# Patient Record
Sex: FEMALE | Race: BLACK OR AFRICAN AMERICAN | Employment: OTHER | RURAL
[De-identification: names, ages, dates, MRNs, and addresses within clinical notes are randomized per-mention and may not be internally consistent; named-entity substitution may affect disease eponyms.]

---

## 2017-01-24 ENCOUNTER — OFFICE VISIT (OUTPATIENT)
Dept: CARDIOLOGY CLINIC | Age: 82
End: 2017-01-24

## 2017-01-24 VITALS
BODY MASS INDEX: 29 KG/M2 | HEART RATE: 64 BPM | SYSTOLIC BLOOD PRESSURE: 150 MMHG | RESPIRATION RATE: 16 BRPM | HEIGHT: 62 IN | OXYGEN SATURATION: 99 % | DIASTOLIC BLOOD PRESSURE: 68 MMHG | WEIGHT: 157.6 LBS

## 2017-01-24 DIAGNOSIS — I10 ESSENTIAL HYPERTENSION: ICD-10-CM

## 2017-01-24 DIAGNOSIS — E78.00 HYPERCHOLESTEROLEMIA: ICD-10-CM

## 2017-01-24 DIAGNOSIS — I35.0 NONRHEUMATIC AORTIC VALVE STENOSIS: Primary | ICD-10-CM

## 2017-01-24 DIAGNOSIS — I50.32 DIASTOLIC CHF, CHRONIC (HCC): ICD-10-CM

## 2017-01-24 NOTE — PROGRESS NOTES
Verified patient with two patient identifiers. Medications reviewed/approved by Dr. Christie Dean. Verbal from Dr. Christie Dean to remove the medications that were deleted during the visit.

## 2017-01-24 NOTE — MR AVS SNAPSHOT
Visit Information Date & Time Provider Department Dept. Phone Encounter #  
 1/24/2017  1:00 PM Duy Chapman [facility] [phone] [id number] Follow-up Instructions Return in about 6 months (around 7/24/2017). Follow-up and Disposition History Upcoming Health Maintenance Date Due  
 FOOT EXAM Q1 11/14/1939 DTaP/Tdap/Td series (1 - Tdap) 11/14/1950 ZOSTER VACCINE AGE 60> 11/14/1989 OSTEOPOROSIS SCREENING (DEXA) 11/14/1994 Pneumococcal 65+ High/Highest Risk (2 of 2 - PCV13) 10/7/2014 EYE EXAM RETINAL OR DILATED Q1 3/21/2015 MICROALBUMIN Q1 9/23/2015 GLAUCOMA SCREENING Q2Y 3/21/2016 INFLUENZA AGE 9 TO ADULT 8/1/2016 HEMOGLOBIN A1C Q6M 4/27/2017 MEDICARE YEARLY EXAM 7/29/2017 LIPID PANEL Q1 10/27/2017 Allergies as of 1/24/2017  Review Complete On: 1/24/2017 By: Juan Eugene MD  
  
 Severity Noted Reaction Type Reactions Pcn [Penicillins]  07/12/2013    Unknown (comments) Current Immunizations  Reviewed on 9/23/2014 Name Date Influenza Vaccine 9/23/2014, 10/7/2013 Influenza Vaccine Ry Preciado) 10/7/2015 Pneumococcal Polysaccharide (PPSV-23) 10/7/2013 Not reviewed this visit You Were Diagnosed With   
  
 Codes Comments Nonrheumatic aortic valve stenosis    -  Primary ICD-10-CM: I35.0 ICD-9-CM: 424.1 Essential hypertension     ICD-10-CM: I10 
ICD-9-CM: 401.9 Hypercholesterolemia     ICD-10-CM: E78.00 ICD-9-CM: 272.0 Diastolic CHF, chronic (HCC)     ICD-10-CM: I50.32 
ICD-9-CM: 428.32, 428.0 Vitals BP Pulse Resp Height(growth percentile) Weight(growth percentile) SpO2  
 150/68 (BP 1 Location: Left arm, BP Patient Position: Sitting) 64 16 5' 1.5\" (1.562 m) 157 lb 9.6 oz (71.5 kg) 99% BMI OB Status Smoking Status 29.3 kg/m2 Postmenopausal Former Smoker Vitals History BMI and BSA Data Body Mass Index Body Surface Area 29.3 kg/m 2 1.76 m 2 Preferred Pharmacy Pharmacy Name Phone [facility], ER - [address] [phone] Your Updated Medication List  
  
   
This list is accurate as of: 1/24/17  1:31 PM.  Always use your most recent med list.  
  
  
  
  
 aspirin delayed-release 81 mg tablet Take  by mouth daily. AZOPT 1 % ophthalmic suspension Generic drug:  brinzolamide Administer 1 Drop to both eyes three (3) times daily. cilostazol 50 mg tablet Commonly known as:  PLETAL  
TAKE 1 TABLET BY MOUTH DAILY BEFORE BREAKFAST AND DINNER  
  
 ferrous sulfate 325 mg (65 mg iron) tablet Take 1 Tab by mouth two (2) times daily (with meals). LUMIGAN 0.03 % ophthalmic drops Generic drug:  bimatoprost  
Administer 1 Drop to both eyes every evening.  
  
 multivitamin tablet Commonly known as:  ONE A DAY Take 1 Tab by mouth daily. olmesartan-hydroCHLOROthiazide 20-12.5 mg per tablet Commonly known as:  BENICAR HCT  
TAKE ONE TABLET BY MOUTH ONCE DAILY  
  
 omeprazole 40 mg capsule Commonly known as:  PRILOSEC  
TAKE 1 CAPSULE BY MOUTH DAILY  
  
 timolol maleate 0.5 % Drpd ophthalmic solution Administer 1 Drop to both eyes daily. traMADol 50 mg tablet Commonly known as:  ULTRAM  
Take 1 Tab by mouth every six (6) hours as needed for Pain. Max Daily Amount: 200 mg. ZETIA 10 mg tablet Generic drug:  ezetimibe TAKE 1 TABLET BY MOUTH DAILY Follow-up Instructions Return in about 6 months (around 7/24/2017). Introducing [facility]! [employer] introduces [employer] patient portal. Now you can access parts of your medical record, email your doctor's office, and request medication refills online. 1. In your internet browser, go to [email or web address] 2. Click on the First Time User? Click Here link in the Sign In box. You will see the New Member Sign Up page. 3. Enter your [employer] Access Code exactly as it appears below. You will not need to use this code after youve completed the sign-up process. If you do not sign up before the expiration date, you must request a new code. · [employer] Access Code: [email or web address] Expires: 1/25/2017 10:42 AM 
 
4. Enter the last four digits of your Social Security Number (xxxx) and Date of Birth (mm/dd/yyyy) as indicated and click Submit. You will be taken to the next sign-up page. 5. Create a [employer] ID. This will be your [employer] login ID and cannot be changed, so think of one that is secure and easy to remember. 6. Create a [employer] password. You can change your password at any time. 7. Enter your Password Reset Question and Answer. This can be used at a later time if you forget your password. 8. Enter your e-mail address. You will receive e-mail notification when new information is available in [address] 9. Click Sign Up. You can now view and download portions of your medical record. 10. Click the Download Summary menu link to download a portable copy of your medical information. If you have questions, please visit the Frequently Asked Questions section of the [employer] website. Remember, [employer] is NOT to be used for urgent needs. For medical emergencies, dial 911. Now available from your iPhone and Android! Please provide this summary of care documentation to your next provider. Your primary care clinician is listed as Letty Borrego. If you have any questions after today's visit, please call [phone].

## 2017-01-24 NOTE — PROGRESS NOTES
Moon Lowe is a 80 y.o. female is here for routine f/u. The patient denies chest pain/ shortness of breath, orthopnea, PND, LE edema, palpitations, syncope, presyncope or fatigue. Patient Active Problem List    Diagnosis Date Noted    Diastolic CHF, chronic ([facility] 07/20/2016    Stroke ([facility]     Glucose intolerance (impaired glucose tolerance)     Helicobacter pylori gastritis (chronic gastritis), treated 05/07/2014    Esophagitis, reflux 05/07/2014    Hiatal hernia 05/07/2014    Anemia 04/09/2014    Hypertension     Hypercholesterolemia     Chronic kidney disease     Aortic stenosis       Janet Saab MD  Past Medical History   Diagnosis Date    Aortic stenosis      Echo 4/15 mod stenosis . 9-1.0 cm2    Chronic kidney disease     Glucose intolerance (impaired glucose tolerance)     Hypercholesterolemia     Hypertension     Stroke ([facility] 2010     sl left sided weakness      Past Surgical History   Procedure Laterality Date    Hx gyn       C SECTION 2    Hx appendectomy      Hx endoscopy  4.2014     active gastritis, loaded w/ helicobacter    Hx colonoscopy  4.2014     Allergies   Allergen Reactions    Pcn [Penicillins] Unknown (comments)      No family history on file. Social History     Social History    Marital status:      Spouse name: N/A    Number of children: N/A    Years of education: N/A     Occupational History    Not on file.      Social History Main Topics    Smoking status: Former Smoker     Types: Cigarettes     Quit date: 7/20/2014    Smokeless tobacco: Never Used    Alcohol use No    Drug use: Not on file    Sexual activity: Not on file     Other Topics Concern    Not on file     Social History Narrative      Current Outpatient Prescriptions   Medication Sig    olmesartan-hydroCHLOROthiazide (BENICAR HCT) 20-12.5 mg per tablet TAKE ONE TABLET BY MOUTH ONCE DAILY    ZETIA 10 mg tablet TAKE 1 TABLET BY MOUTH DAILY    multivitamin (ONE A DAY) tablet Take 1 Tab by mouth daily.  cilostazol (PLETAL) 50 mg tablet TAKE 1 TABLET BY MOUTH DAILY BEFORE BREAKFAST AND DINNER    omeprazole (PRILOSEC) 40 mg capsule TAKE 1 CAPSULE BY MOUTH DAILY    traMADol (ULTRAM) 50 mg tablet Take 1 Tab by mouth every six (6) hours as needed for Pain. Max Daily Amount: 200 mg.  ferrous sulfate 325 mg (65 mg iron) tablet Take 1 Tab by mouth two (2) times daily (with meals).  timolol maleate 0.5 % DrpD ophthalmic solution Administer 1 Drop to both eyes daily.  bimatoprost (LUMIGAN) 0.03 % ophthalmic drops Administer 1 Drop to both eyes every evening.  aspirin delayed-release 81 mg tablet Take  by mouth daily.  brinzolamide (AZOPT) 1 % ophthalmic suspension Administer 1 Drop to both eyes three (3) times daily. No current facility-administered medications for this visit. Review of Symptoms:    CONST  No weight change. No fever, chills, sweats    ENT No visual changes, URI sx, sore throat    CV  See HPI   RESP  No cough, or sputum, wheezing. Also see HPI   GI  No abdominal pain or change in bowel habits. No heartburn or dysphagia. No melena or rectal bleeding.   No dysuria, urgency, frequency, hematuria   MSKEL  No joint pain, swelling. No muscle pain. SKIN  No rash or lesions. NEURO  No headache, syncope, or seizure. No weakness, loss of sensation, or paresthesias. PSYCH  No low mood or depression  No anxiety. HE/LYMPH  No easy bruising, abnormal bleeding, or enlarged glands.         Physical ExamPhysical Exam:    Visit Vitals    /68 (BP 1 Location: Left arm, BP Patient Position: Sitting)    Pulse 64    Resp 16    Ht 5' 1.5\" (1.562 m)    Wt 157 lb 9.6 oz (71.5 kg)    SpO2 99%    BMI 29.3 kg/m2     Gen: NAD  HEENT:  PERRL, throat clear  Neck: no mass or thyromegaly, no JVD   Heart:  Regular,Nl S1S2,  II/vI murmur, no gallop or rub.   Lungs:  clear  Abdomen:   Soft, non-tender, bowel sounds are active.   Extremities: No edema  Pulse: symmetric  Neuro: A&O times 3, WNL      Cardiographics    CARDIAC TESTING:    ECHO 6/2/16--sev LVH, LVEF 60-65, D1, mod LAE, mod-severe AS (m33/p48), mild AI, mild MS, RVSP <35.       Labs:   Lab Results   Component Value Date/Time    Sodium 144 10/27/2016 11:36 AM    Sodium 141 07/28/2016 10:17 AM    Sodium 141 05/03/2016 11:20 AM    Sodium 143 04/07/2016 03:43 PM    Sodium 144 10/07/2015 11:14 AM    Potassium 5.2 10/27/2016 11:36 AM    Potassium 5.0 07/28/2016 10:17 AM    Potassium 4.6 05/03/2016 11:20 AM    Potassium 4.5 04/07/2016 03:43 PM    Potassium 4.5 10/07/2015 11:14 AM    Chloride 106 10/27/2016 11:36 AM    Chloride 99 07/28/2016 10:17 AM    Chloride 101 05/03/2016 11:20 AM    Chloride 104 04/07/2016 03:43 PM    Chloride 105 10/07/2015 11:14 AM    CO2 25 10/27/2016 11:36 AM    CO2 23 07/28/2016 10:17 AM    CO2 25 05/03/2016 11:20 AM    CO2 23 04/07/2016 03:43 PM    CO2 23 10/07/2015 11:14 AM    Glucose 118 10/27/2016 11:36 AM    Glucose 158 07/28/2016 10:17 AM    Glucose 187 05/03/2016 11:20 AM    Glucose 99 04/07/2016 03:43 PM    Glucose 135 10/07/2015 11:14 AM    BUN 46 10/27/2016 11:36 AM    BUN 41 07/28/2016 10:17 AM    BUN 34 05/03/2016 11:20 AM    BUN 31 04/07/2016 03:43 PM    BUN 26 10/07/2015 11:14 AM    Creatinine 2.39 10/27/2016 11:36 AM    Creatinine 2.09 07/28/2016 10:17 AM    Creatinine 2.26 05/03/2016 11:20 AM    Creatinine 1.20 04/07/2016 03:43 PM    Creatinine 1.33 10/07/2015 11:14 AM    BUN/Creatinine ratio 19 10/27/2016 11:36 AM    BUN/Creatinine ratio 20 07/28/2016 10:17 AM    BUN/Creatinine ratio 15 05/03/2016 11:20 AM    BUN/Creatinine ratio 26 04/07/2016 03:43 PM    BUN/Creatinine ratio 20 10/07/2015 11:14 AM    GFR est AA 21 10/27/2016 11:36 AM    GFR est AA 24 07/28/2016 10:17 AM    GFR est AA 22 05/03/2016 11:20 AM    GFR est AA 47 04/07/2016 03:43 PM    GFR est AA 42 10/07/2015 11:14 AM    GFR est non-AA 18 10/27/2016 11:36 AM    GFR est non-AA 21 07/28/2016 10:17 AM    GFR est non-AA 19 05/03/2016 11:20 AM    GFR est non-AA 41 04/07/2016 03:43 PM    GFR est non-AA 36 10/07/2015 11:14 AM    Calcium 9.9 10/27/2016 11:36 AM    Calcium 10.2 07/28/2016 10:17 AM    Calcium 9.6 05/03/2016 11:20 AM    Calcium 10.0 04/07/2016 03:43 PM    Calcium 9.9 10/07/2015 11:14 AM    Bilirubin, total 0.3 10/27/2016 11:36 AM    Bilirubin, total 0.3 07/28/2016 10:17 AM    Bilirubin, total 0.4 04/07/2016 03:43 PM    ALT 10 10/27/2016 11:36 AM    ALT 7 07/28/2016 10:17 AM    ALT 9 04/07/2016 03:43 PM    ALT 7 09/23/2014 10:17 AM    ALT 11 10/07/2013 09:47 AM    AST 15 10/27/2016 11:36 AM    AST 12 07/28/2016 10:17 AM    AST 13 04/07/2016 03:43 PM    AST 21 03/24/2015 02:45 PM    Alk. phosphatase 62 10/27/2016 11:36 AM    Alk. phosphatase 54 07/28/2016 10:17 AM    Alk.  phosphatase 65 04/07/2016 03:43 PM    Protein, total 6.9 10/27/2016 11:36 AM    Protein, total 6.8 07/28/2016 10:17 AM    Protein, total 6.8 04/07/2016 03:43 PM    Albumin 4.0 10/27/2016 11:36 AM    Albumin 4.1 07/28/2016 10:17 AM    Albumin 4.2 04/07/2016 03:43 PM    Albumin 4.0 09/23/2014 10:17 AM    Albumin 4.1 03/21/2014 09:43 AM    A-G Ratio 1.4 10/27/2016 11:36 AM    A-G Ratio 1.5 07/28/2016 10:17 AM    A-G Ratio 1.6 04/07/2016 03:43 PM     No results found for: CPK, CPKX, CPX  Lab Results   Component Value Date/Time    Cholesterol, total 168 10/27/2016 11:36 AM    Cholesterol, total 157 03/24/2015 02:45 PM    Cholesterol, total 187 09/23/2014 10:17 AM    Cholesterol, total 138 10/07/2013 09:47 AM    HDL Cholesterol 55 10/27/2016 11:36 AM    HDL Cholesterol 49 03/24/2015 02:45 PM    HDL Cholesterol 74 09/23/2014 10:17 AM    HDL Cholesterol 52 10/07/2013 09:47 AM    LDL, calculated 88 10/27/2016 11:36 AM    LDL, calculated 84 03/24/2015 02:45 PM    LDL, calculated 93 09/23/2014 10:17 AM    LDL, calculated 69 10/07/2013 09:47 AM    Triglyceride 125 10/27/2016 11:36 AM    Triglyceride 122 03/24/2015 02:45 PM    Triglyceride 101 09/23/2014 10:17 AM    Triglyceride 83 10/07/2013 09:47 AM     No results found for this or any previous visit. Assessment:         Patient Active Problem List    Diagnosis Date Noted    Diastolic CHF, chronic ([facility] 07/20/2016    Stroke ([facility]     Glucose intolerance (impaired glucose tolerance)     Helicobacter pylori gastritis (chronic gastritis), treated 05/07/2014    Esophagitis, reflux 05/07/2014    Hiatal hernia 05/07/2014    Anemia 04/09/2014    Hypertension     Hypercholesterolemia     Chronic kidney disease     Aortic stenosis         Plan:     Doing well with no adverse cardiac symptoms. Lipids and labs followed by PCP. Continue current care and f/u in 6 months.     Tanvi Anderson MD

## 2017-03-17 ENCOUNTER — TELEPHONE (OUTPATIENT)
Dept: FAMILY MEDICINE CLINIC | Age: 82
End: 2017-03-17

## 2017-03-17 NOTE — TELEPHONE ENCOUNTER
Pt needs follow up from ER for leg weakness. Made appointment with Je Hickman for Monday March 20, 2017 @ 2:30. Pt's aid aware.

## 2017-03-17 NOTE — TELEPHONE ENCOUNTER
Patient would like to be worked in one day next week (has transportation to schedule in advance) for several spells of falling. Caregiver notes the rescue squad had been called and that she went to [facility]. Notes in [address] [facility] now.

## 2017-04-04 ENCOUNTER — OFFICE VISIT (OUTPATIENT)
Dept: FAMILY MEDICINE CLINIC | Age: 82
End: 2017-04-04

## 2017-04-04 VITALS
WEIGHT: 157.4 LBS | OXYGEN SATURATION: 99 % | BODY MASS INDEX: 27.89 KG/M2 | HEART RATE: 66 BPM | SYSTOLIC BLOOD PRESSURE: 120 MMHG | RESPIRATION RATE: 20 BRPM | DIASTOLIC BLOOD PRESSURE: 54 MMHG | HEIGHT: 63 IN | TEMPERATURE: 98 F

## 2017-04-04 DIAGNOSIS — I10 ESSENTIAL HYPERTENSION: Primary | ICD-10-CM

## 2017-04-04 DIAGNOSIS — E78.5 HYPERLIPIDEMIA, UNSPECIFIED HYPERLIPIDEMIA TYPE: ICD-10-CM

## 2017-04-04 RX ORDER — EZETIMIBE 10 MG/1
10 TABLET ORAL DAILY
Qty: 90 TAB | Refills: 3 | Status: SHIPPED | OUTPATIENT
Start: 2017-04-04 | End: 2018-04-05 | Stop reason: SDUPTHER

## 2017-04-04 RX ORDER — CILOSTAZOL 50 MG/1
TABLET ORAL
Qty: 180 TAB | Refills: 11 | Status: CANCELLED | OUTPATIENT
Start: 2017-04-04

## 2017-04-04 RX ORDER — OLMESARTAN MEDOXOMIL AND HYDROCHLOROTHIAZIDE 20/12.5 20; 12.5 MG/1; MG/1
TABLET ORAL
Qty: 90 TAB | Refills: 0 | Status: SHIPPED | OUTPATIENT
Start: 2017-04-04 | End: 2017-08-14 | Stop reason: SDUPTHER

## 2017-04-04 NOTE — MR AVS SNAPSHOT
Visit Information Date & Time Provider Department Dept. Phone Encounter #  
 4/4/2017 11:30 AM Marcelo Melendez [address] [phone] [id number] Follow-up Instructions Return in about 2 months (around 6/4/2017). Follow-up and Disposition History Your Appointments 8/4/2017 11:20 AM  
ESTABLISHED PATIENT with Tatyana Mccarty MD  
[address] [facility]) Appt Note: 6 MO FU $0CP  
 [address] [id number] [phone]  
  
   
 [address] [address] Upcoming Health Maintenance Date Due  
 FOOT EXAM Q1 11/14/1939 DTaP/Tdap/Td series (1 - Tdap) 11/14/1950 ZOSTER VACCINE AGE 60> 11/14/1989 OSTEOPOROSIS SCREENING (DEXA) 11/14/1994 Pneumococcal 65+ Low/Medium Risk (2 of 2 - PCV13) 10/7/2014 EYE EXAM RETINAL OR DILATED Q1 3/21/2015 MICROALBUMIN Q1 9/23/2015 GLAUCOMA SCREENING Q2Y 3/21/2016 INFLUENZA AGE 9 TO ADULT 8/1/2016 HEMOGLOBIN A1C Q6M 4/27/2017 MEDICARE YEARLY EXAM 7/29/2017 LIPID PANEL Q1 10/27/2017 Allergies as of 4/4/2017  Review Complete On: 4/4/2017 By: Marcelo Melendez MD  
  
 Severity Noted Reaction Type Reactions Pcn [Penicillins]  07/12/2013    Unknown (comments) Current Immunizations  Reviewed on 9/23/2014 Name Date Influenza Vaccine 9/23/2014, 10/7/2013 Influenza Vaccine Evins Sluder) 10/7/2015 Pneumococcal Polysaccharide (PPSV-23) 10/7/2013 Not reviewed this visit You Were Diagnosed With   
  
 Codes Comments Essential hypertension    -  Primary ICD-10-CM: I10 
ICD-9-CM: 401.9 Hyperlipidemia, unspecified hyperlipidemia type     ICD-10-CM: E78.5 ICD-9-CM: 272.4 Vitals BP Pulse Temp Resp Height(growth percentile) Weight(growth percentile)  120/54 (BP 1 Location: Left arm, BP Patient Position: Sitting) 66 98 °F (36.7 °C) (Temporal) 20 5' 3\" (1.6 m) 157 lb 6.4 oz (71.4 kg) SpO2 BMI OB Status Smoking Status 99% 27.88 kg/m2 Postmenopausal Former Smoker BMI and BSA Data Body Mass Index Body Surface Area  
 27.88 kg/m 2 1.78 m 2 Preferred Pharmacy Pharmacy Name Phone [address] [phone] Your Updated Medication List  
  
   
This list is accurate as of: 4/4/17  1:07 PM.  Always use your most recent med list.  
  
  
  
  
 aspirin delayed-release 81 mg tablet Take  by mouth daily. cilostazol 50 mg tablet Commonly known as:  PLETAL  
TAKE 1 TABLET BY MOUTH DAILY BEFORE BREAKFAST AND DINNER  
  
 ezetimibe 10 mg tablet Commonly known as:  Shelba Fleet Take 1 Tab by mouth daily. Indications: cholesterol and heart Iron 325 mg (65 mg iron) tablet Generic drug:  ferrous sulfate TAKE ONE TABLET BY MOUTH TWICE DAILY WITH MEALS  
  
 LUMIGAN 0.03 % ophthalmic drops Generic drug:  bimatoprost  
Administer 1 Drop to both eyes every evening.  
  
 multivitamin tablet Commonly known as:  ONE A DAY Take 1 Tab by mouth daily. olmesartan-hydroCHLOROthiazide 20-12.5 mg per tablet Commonly known as:  BENICAR HCT  
TAKE ONE TABLET BY MOUTH ONCE DAILY  Indications: pressure  
  
 omeprazole 40 mg capsule Commonly known as:  PRILOSEC  
TAKE 1 CAPSULE BY MOUTH DAILY  
  
 timolol maleate 0.5 % Drpd ophthalmic solution Administer 1 Drop to both eyes daily. Prescriptions Sent to Pharmacy Refills  
 olmesartan-hydroCHLOROthiazide (BENICAR HCT) 20-12.5 mg per tablet 0 Sig: TAKE ONE TABLET BY MOUTH ONCE DAILY  Indications: pressure Class: Normal  
 Pharmacy: [address] [place] [address], [address] [place] [address]. #1  #: [phone]  
 ezetimibe (ZETIA) 10 mg tablet 3 Sig: Take 1 Tab by mouth daily. Indications: cholesterol and heart  Class: Normal  
 Pharmacy: [address] [place] [address]. #1  #: [phone] Route: Oral  
  
Follow-up Instructions Return in about 2 months (around 6/4/2017). Patient Instructions To strengthen legs: 
10 shallow squats 3x daily To strengthen arms: 
10 standing pushups 2x daily Patient Instructions History Introducing [facility]! [employer] introduces [employer] patient portal. Now you can access parts of your medical record, email your doctor's office, and request medication refills online. 1. In your internet browser, go to [email or web address] 2. Click on the First Time User? Click Here link in the Sign In box. You will see the New Member Sign Up page. 3. Enter your [employer] Access Code exactly as it appears below. You will not need to use this code after youve completed the sign-up process. If you do not sign up before the expiration date, you must request a new code. · [employer] Access Code: [email or web address] Expires: 6/12/2017 12:41 PM 
 
4. Enter the last four digits of your Social Security Number (xxxx) and Date of Birth (mm/dd/yyyy) as indicated and click Submit. You will be taken to the next sign-up page. 5. Create a [employer] ID. This will be your [employer] login ID and cannot be changed, so think of one that is secure and easy to remember. 6. Create a [employer] password. You can change your password at any time. 7. Enter your Password Reset Question and Answer. This can be used at a later time if you forget your password. 8. Enter your e-mail address. You will receive e-mail notification when new information is available in [address] 9. Click Sign Up. You can now view and download portions of your medical record. 10. Click the Download Summary menu link to download a portable copy of your medical information.  
 
If you have questions, please visit the Frequently Asked Questions section of the [employer]. Remember, [employer] is NOT to be used for urgent needs. For medical emergencies, dial 911. Now available from your iPhone and Android! Please provide this summary of care documentation to your next provider. Your primary care clinician is listed as Júnior Brannon. If you have any questions after today's visit, please call [phone].

## 2017-04-04 NOTE — PROGRESS NOTES
Stiven Maier is a 80 y.o. female presenting for/with:    Check Up      HPI:  Symptoms include fall over her walker 2 wk ago. No LOC. Saw ER 3/14/17. Monitored, had labs, dx with UTI and d/c home. No falls since. Some feelings of weakness with getting up, but no orthostasis. Treatment to date: given abx for UTI, and rest.    Hypertension. Blood pressures have been stable. Management at last visit included continuing current regimen . Current regimen: angiotensin II receptor antagonist and thiazide diuretic. Symptoms include no sx. Patient denies chest pain, palpitations, peripheral edema, headache, blurred vision. Lab review:   Lab Results   Component Value Date/Time    Sodium 141 03/14/2017 01:30 PM    Potassium 3.9 03/14/2017 01:30 PM    Chloride 105 03/14/2017 01:30 PM    CO2 28 03/14/2017 01:30 PM    Anion gap 8 03/14/2017 01:30 PM    Glucose 114 03/14/2017 01:30 PM    BUN 24 03/14/2017 01:30 PM    Creatinine 1.67 03/14/2017 01:30 PM    BUN/Creatinine ratio 14 03/14/2017 01:30 PM    GFR est AA 35 03/14/2017 01:30 PM    GFR est non-AA 29 03/14/2017 01:30 PM    Calcium 9.9 03/14/2017 01:30 PM     Hyperlipidemia. On zetia and pletal. Ritika well. No myalgias, arthralgias, unusual weakness. Lab Results   Component Value Date/Time    Cholesterol, total 168 10/27/2016 11:36 AM    HDL Cholesterol 55 10/27/2016 11:36 AM    LDL, calculated 88 10/27/2016 11:36 AM    VLDL, calculated 25 10/27/2016 11:36 AM    Triglyceride 125 10/27/2016 11:36 AM     Lab Results   Component Value Date/Time    ALT (SGPT) 11 03/14/2017 01:30 PM    AST (SGOT) 16 03/14/2017 01:30 PM    Alk. phosphatase 75 03/14/2017 01:30 PM    Bilirubin, total 0.6 03/14/2017 01:30 PM     PMH, SH, Medications/Allergies: reviewed, on chart.     ROS:  Constitutional: No fever, chills or weight loss  Respiratory: No cough, SOB   CV: No chest pain or Palpitations    Visit Vitals    /54 (BP 1 Location: Left arm, BP Patient Position: Sitting)    Pulse 66  Temp 98 °F (36.7 °C) (Temporal)    Resp 20    Ht 5' 3\" (1.6 m)    Wt 157 lb 6.4 oz (71.4 kg)    SpO2 99%    BMI 27.88 kg/m2     Wt Readings from Last 3 Encounters:   04/04/17 157 lb 6.4 oz (71.4 kg)   03/14/17 152 lb (68.9 kg)   01/24/17 157 lb 9.6 oz (71.5 kg)     Physical Examination: General appearance - alert, well appearing, and in no distress  Mental status - alert, oriented to person, place, and time  Eyes - pupils equal and reactive, extraocular eye movements intact  ENT - bilateral external ears and nose normal. Normal lips  Neck - supple, no significant adenopathy, no thyromegaly or mass  Lymphatics - no palpable lymphadenopathy, no hepatosplenomegaly  Chest - clear to auscultation, no wheezes, rales or rhonchi, symmetric air entry  Heart - normal rate, regular rhythm, normal S1, S2, no murmurs, rubs, clicks or gallops  Extremities - peripheral pulses normal, no pedal edema, no clubbing or cyanosis    A/P  HTN  well controlled. con't current tx. HLD and presumed PAD  well controlled. con't current tx.     F/u 3mo/PRN

## 2017-04-04 NOTE — PATIENT INSTRUCTIONS
To strengthen legs:  10 shallow squats 3x daily    To strengthen arms:  10 standing pushups 2x daily

## 2017-04-25 RX ORDER — CILOSTAZOL 50 MG/1
TABLET ORAL
Qty: 60 TAB | Refills: 11 | Status: SHIPPED | OUTPATIENT
Start: 2017-04-25 | End: 2017-12-21 | Stop reason: SDUPTHER

## 2017-06-06 RX ORDER — OMEPRAZOLE 40 MG/1
CAPSULE, DELAYED RELEASE ORAL
Qty: 90 CAP | Refills: 3 | Status: SHIPPED | OUTPATIENT
Start: 2017-06-06 | End: 2018-04-26 | Stop reason: SDUPTHER

## 2017-09-19 ENCOUNTER — OFFICE VISIT (OUTPATIENT)
Dept: FAMILY MEDICINE CLINIC | Age: 82
End: 2017-09-19

## 2017-09-19 VITALS
OXYGEN SATURATION: 99 % | BODY MASS INDEX: 28.17 KG/M2 | HEIGHT: 63 IN | TEMPERATURE: 98 F | DIASTOLIC BLOOD PRESSURE: 70 MMHG | SYSTOLIC BLOOD PRESSURE: 158 MMHG | WEIGHT: 159 LBS | HEART RATE: 72 BPM

## 2017-09-19 DIAGNOSIS — I10 ESSENTIAL HYPERTENSION: ICD-10-CM

## 2017-09-19 DIAGNOSIS — Z78.0 POSTMENOPAUSAL: ICD-10-CM

## 2017-09-19 DIAGNOSIS — Z13.39 SCREENING FOR ALCOHOLISM: ICD-10-CM

## 2017-09-19 DIAGNOSIS — Z00.00 MEDICARE ANNUAL WELLNESS VISIT, SUBSEQUENT: Primary | ICD-10-CM

## 2017-09-19 DIAGNOSIS — R73.02 GLUCOSE INTOLERANCE (IMPAIRED GLUCOSE TOLERANCE): ICD-10-CM

## 2017-09-19 DIAGNOSIS — E78.00 HYPERCHOLESTEROLEMIA: ICD-10-CM

## 2017-09-19 DIAGNOSIS — I63.9 CEREBROVASCULAR ACCIDENT (CVA), UNSPECIFIED MECHANISM (HCC): ICD-10-CM

## 2017-09-19 DIAGNOSIS — Z13.31 SCREENING FOR DEPRESSION: ICD-10-CM

## 2017-09-19 NOTE — PROGRESS NOTES
Brenda Aviles is a 80 y.o. female presenting for/with:    Hypertension (fu on bp)    HPI:  No recent falls. Strength is better since last visit. Hypertension. Blood pressures have been stable. Management at last visit included continuing current regimen . Current regimen: angiotensin II receptor antagonist and thiazide diuretic. Symptoms include no sx. Patient denies chest pain, palpitations, peripheral edema, headache, blurred vision. Lab review:   Lab Results   Component Value Date/Time    Sodium 141 03/14/2017 01:30 PM    Potassium 3.9 03/14/2017 01:30 PM    Chloride 105 03/14/2017 01:30 PM    CO2 28 03/14/2017 01:30 PM    Anion gap 8 03/14/2017 01:30 PM    Glucose 114 03/14/2017 01:30 PM    BUN 24 03/14/2017 01:30 PM    Creatinine 1.67 03/14/2017 01:30 PM    BUN/Creatinine ratio 14 03/14/2017 01:30 PM    GFR est AA 35 03/14/2017 01:30 PM    GFR est non-AA 29 03/14/2017 01:30 PM    Calcium 9.9 03/14/2017 01:30 PM     Hyperlipidemia. On zetia and pletal. Ritika well. No myalgias, arthralgias, unusual weakness. Lab Results   Component Value Date/Time    Cholesterol, total 168 10/27/2016 11:36 AM    HDL Cholesterol 55 10/27/2016 11:36 AM    LDL, calculated 88 10/27/2016 11:36 AM    VLDL, calculated 25 10/27/2016 11:36 AM    Triglyceride 125 10/27/2016 11:36 AM     Lab Results   Component Value Date/Time    ALT (SGPT) 11 03/14/2017 01:30 PM    AST (SGOT) 16 03/14/2017 01:30 PM    Alk. phosphatase 75 03/14/2017 01:30 PM    Bilirubin, total 0.6 03/14/2017 01:30 PM     PMH, SH, Medications/Allergies: reviewed, on chart.     ROS:  Constitutional: No fever, chills or weight loss  Respiratory: No cough, SOB   CV: No chest pain or Palpitations    Visit Vitals    /70 (BP 1 Location: Left arm, BP Patient Position: Sitting)    Pulse 72    Temp 98 °F (36.7 °C) (Temporal)    Ht 5' 3\" (1.6 m)    Wt 159 lb (72.1 kg)    SpO2 99%    BMI 28.17 kg/m2     Wt Readings from Last 3 Encounters:   09/19/17 159 lb (72.1 kg)   04/04/17 157 lb 6.4 oz (71.4 kg)   03/14/17 152 lb (68.9 kg)     BP Readings from Last 3 Encounters:   09/19/17 158/70   04/04/17 120/54   03/14/17 149/59     Physical Examination: General appearance - alert, well appearing, and in no distress  Mental status - alert, oriented to person, place, and time  Eyes - pupils equal and reactive, extraocular eye movements intact  ENT - bilateral external ears and nose normal. Normal lips  Neck - supple, no significant adenopathy, no thyromegaly or mass  Lymphatics - no palpable lymphadenopathy, no hepatosplenomegaly  Chest - clear to auscultation, no wheezes, rales or rhonchi, symmetric air entry  Heart - normal rate, regular rhythm, normal S1, S2, no murmurs, rubs, clicks or gallops  Extremities - peripheral pulses normal, no pedal edema, no clubbing or cyanosis    A/P  HTN  Up initially, previosly controlled. con't current tx. BP checks, if still up, plan add norvasc 2.5mg qd    HLD and presumed PAD  well controlled. con't current tx. IGT  Check A1c. F/u 3mo/PRN    ______________________________________________________________________    Matthew Soto is a 80 y.o. female and presents for annual Medicare Wellness Visit. Problem List: Reviewed with patient and discussed risk factors. Patient Active Problem List   Diagnosis Code    Hypertension I10    Hypercholesterolemia E78.00    Chronic kidney disease N18.9    Aortic stenosis I35.0    Anemia G42.2    Helicobacter pylori gastritis (chronic gastritis), treated K29.50, B96.81    Esophagitis, reflux K21.0    Hiatal hernia K44.9    Glucose intolerance (impaired glucose tolerance) R73.02    Stroke (HCC) U02.5    Diastolic CHF, chronic (HCC) I50.32       Current medical providers:  Patient Care Team:  Talia Abdul NP as PCP - General (Nurse Practitioner)  Karen Monroy MD (Cardiology)    [facility], [address], Medications/Allergies: reviewed, on chart. Female Alcohol Screening:   On any occasion during the past 3 months, have you had more than 3 drinks containing alcohol? No    Do you average more than 7 drinks per week? No    ROS:  Constitutional: No fever, chills or weight loss  Respiratory: No cough, SOB   CV: No chest pain or Palpitations    Objective:  Visit Vitals    /70 (BP 1 Location: Left arm, BP Patient Position: Sitting)    Pulse 72    Temp 98 °F (36.7 °C) (Temporal)    Ht 5' 3\" (1.6 m)    Wt 159 lb (72.1 kg)    SpO2 99%    BMI 28.17 kg/m2    Body mass index is 28.17 kg/(m^2). Assessment of cognitive impairment: Alert and oriented x 3    Depression Screen:   PHQ over the last two weeks 9/19/2017   PHQ Not Done -   Little interest or pleasure in doing things Not at all   Feeling down, depressed or hopeless Not at all   Total Score PHQ 2 0       Fall Risk Assessment:    Fall Risk Assessment, last 12 mths 9/19/2017   Able to walk? Yes   Fall in past 12 months? Yes   Fall with injury? No   Number of falls in past 12 months 1   Fall Risk Score 1     Functional Ability:   Does the patient exhibit a steady gait? Yes, with walker   How long did it take the patient to get up and walk from a sitting position? 5s   Is the patient self reliant?  (ie can do own laundry, meals, household chores)  Yes, with assistance   Does the patient handle his/her own medications? Yes, with assist.     Does the patient handle his/her own money? Yes, with help     Is the patients home safe (ie good lighting, handrails on stairs and bath, etc.)? yes     Did you notice or did patient express any hearing difficulties? no     Did you notice or did patient express any vision difficulties? no       Advance Care Planning:   Patient was offered the opportunity to discuss advance care planning:  yes     Does patient have an Advance Directive:  no   If no, did you provide information on Caring Connections?   yes       Plan:       Orders Placed This Encounter    Depression Screen Annual    DEXA BONE DENSITY STUDY AXIAL    METABOLIC PANEL, BASIC    LIPID PANEL    HEMOGLOBIN A1C WITH EAG    Annual  Alcohol Screen 15 min ()       Health Maintenance   Topic Date Due    ZOSTER VACCINE AGE 60>  09/14/1989    OSTEOPOROSIS SCREENING (DEXA)  11/14/1994    EYE EXAM RETINAL OR DILATED Q1  03/21/2015    GLAUCOMA SCREENING Q2Y  03/21/2016    HEMOGLOBIN A1C Q6M  04/27/2017    MEDICARE YEARLY EXAM  07/29/2017    Pneumococcal 65+ Low/Medium Risk (2 of 2 - PCV13) 10/23/2017 (Originally 10/7/2014)    INFLUENZA AGE 9 TO ADULT  10/23/2017 (Originally 8/1/2017)    LIPID PANEL Q1  10/27/2017    FOOT EXAM Q1  09/19/2018    MICROALBUMIN Q1  09/19/2018    DTaP/Tdap/Td series (2 - Td) 09/19/2027       *Patient verbalized understanding and agreement with the plan. A copy of the After Visit Summary with personalized health plan was given to the patient today.

## 2017-09-19 NOTE — MR AVS SNAPSHOT
Visit Information Date & Time Provider Department Dept. Phone Encounter #  
 9/19/2017 10:30 AM Mamie Bauer MD [address] [place] [address] [phone] [id number] Follow-up Instructions Return in about 3 months (around 12/19/2017). Follow-up and Disposition History Upcoming Health Maintenance Date Due ZOSTER VACCINE AGE 60> 9/14/1989 OSTEOPOROSIS SCREENING (DEXA) 11/14/1994 EYE EXAM RETINAL OR DILATED Q1 3/21/2015 GLAUCOMA SCREENING Q2Y 3/21/2016 HEMOGLOBIN A1C Q6M 4/27/2017 MEDICARE YEARLY EXAM 7/29/2017 Pneumococcal 65+ Low/Medium Risk (2 of 2 - PCV13) 10/23/2017* INFLUENZA AGE 9 TO ADULT 10/23/2017* LIPID PANEL Q1 10/27/2017 FOOT EXAM Q1 9/19/2018 MICROALBUMIN Q1 9/19/2018 DTaP/Tdap/Td series (2 - Td) 9/19/2027 *Topic was postponed. The date shown is not the original due date. Allergies as of 9/19/2017  Review Complete On: 9/19/2017 By: Mamie Bauer MD  
  
 Severity Noted Reaction Type Reactions Pcn [Penicillins]  07/12/2013    Unknown (comments) Current Immunizations  Reviewed on 9/23/2014 Name Date Influenza Vaccine 9/23/2014, 10/7/2013 Influenza Vaccine Lady Salomon) 10/7/2015 Pneumococcal Polysaccharide (PPSV-23) 10/7/2013 Not reviewed this visit You Were Diagnosed With   
  
 Codes Comments Medicare annual wellness visit, subsequent    -  Primary ICD-10-CM: Z00.00 ICD-9-CM: V70.0 Essential hypertension     ICD-10-CM: I10 
ICD-9-CM: 401.9 Hypercholesterolemia     ICD-10-CM: E78.00 ICD-9-CM: 272.0 Cerebrovascular accident (CVA), unspecified mechanism ([facility] [address]     ICD-10-CM: I63.9 ICD-9-CM: 434.91 Glucose intolerance (impaired glucose tolerance)     ICD-10-CM: R73.02 
ICD-9-CM: 790.22 Screening for alcoholism     ICD-10-CM: Z13.89 ICD-9-CM: V79.1 Screening for depression     ICD-10-CM: Z13.89 ICD-9-CM: V79.0 Postmenopausal     ICD-10-CM: Z78.0 ICD-9-CM: V49.81 Vitals BP Pulse Temp Height(growth percentile) Weight(growth percentile) SpO2  
 158/70 (BP 1 Location: Left arm, BP Patient Position: Sitting) 72 98 °F (36.7 °C) (Temporal) 5' 3\" (1.6 m) 159 lb (72.1 kg) 99% BMI OB Status Smoking Status 28.17 kg/m2 Postmenopausal Former Smoker Vitals History BMI and BSA Data Body Mass Index Body Surface Area  
 28.17 kg/m 2 1.79 m 2 Preferred Pharmacy Pharmacy Name Phone [phone] [phone] Your Updated Medication List  
  
   
This list is accurate as of: 9/19/17 11:50 AM.  Always use your most recent med list.  
  
  
  
  
 aspirin delayed-release 81 mg tablet Take  by mouth daily. cilostazol 50 mg tablet Commonly known as:  PLETAL  
TAKE 1 TABLET BY MOUTH DAILY BEFORE BREAKFAST AND DINNER  Indications: cramps  
  
 ezetimibe 10 mg tablet Commonly known as:  Yves García Take 1 Tab by mouth daily. Indications: cholesterol and heart Iron 325 mg (65 mg iron) tablet Generic drug:  ferrous sulfate TAKE ONE TABLET BY MOUTH TWICE DAILY WITH MEALS  
  
 LUMIGAN 0.03 % ophthalmic drops Generic drug:  bimatoprost  
Administer 1 Drop to both eyes every evening.  
  
 multivitamin tablet Commonly known as:  ONE A DAY Take 1 Tab by mouth daily. olmesartan-hydroCHLOROthiazide 20-12.5 mg per tablet Commonly known as:  BENICAR HCT  
TAKE 1 TABLET BY MOUTH DAILY FOR BLOOD PRESSURE  
  
 omeprazole 40 mg capsule Commonly known as:  PRILOSEC  
TAKE 1 CAPSULE BY MOUTH DAILY  
  
 timolol maleate 0.5 % Drpd ophthalmic solution Administer 1 Drop to both eyes daily. [id number] HEMOGLOBIN A1C WITH EAG [99761 CPT(R)] LIPID PANEL [40217 CPT(R)] METABOLIC PANEL, BASIC [37420 CPT(R)] MO ANNUAL ALCOHOL SCREEN 15 MIN [id number] [facility]] Follow-up Instructions Return in about 3 months (around 12/19/2017). To-Do List   
 09/19/2017 Imaging:  DEXA BONE DENSITY STUDY AXIAL Patient Instructions Please get 3 blood pressure checks done over the next 4 weeks and send me the numbers. Medicare Wellness Visit, Female The best way to live healthy is to have a healthy lifestyle by eating a well-balanced diet, exercising regularly, limiting alcohol and stopping smoking. Regular physical exams and screening tests are another way to keep healthy. Preventive exams provided by your health care provider can find health problems before they become diseases or illnesses. Preventive services including immunizations, screening tests, monitoring and exams can help you take care of your own health. All people over age 72 should have a pneumovax  and and a prevnar shot to prevent pneumonia. These are once in a lifetime unless you and your provider decide differently. All people over 65 should have a yearly flu shot and a tetanus vaccine every 10 years. A bone mass density to screen for osteoporosis or thinning of the bones should be done every 2 years after 65. Screening for diabetes mellitus with a blood sugar test should be done every year. Glaucoma is a disease of the eye due to increased ocular pressure that can lead to blindness and it should be done every year by an eye professional. 
 
Cardiovascular screening tests that check for elevated lipids (fatty part of blood) which can lead to heart disease and strokes should be done every 5 years. Colorectal screening that evaluates for blood or polyps in your colon should be done yearly as a stool test or every five years as a flexible sigmoidoscope or every 10 years as a colonoscopy up to age 76. Breast cancer screening with a mammogram is recommended biennially  for women age 54-69.  
 
Screening for cervical cancer with a pap smear and pelvic exam is recommended for women after age 72 years every 2 years up to age 79 or when the provider and patient decide to stop. If there is a history of cervical abnormalities or other increased risk for cancer then the test is recommended yearly. Hepatitis C screening is also recommended for anyone born between [birth year] through [address]. A shingles vaccine is also recommended once in a lifetime after age 61. Your Medicare Wellness Exam is recommended annually. Here is a list of your current Health Maintenance items with a due date: 
Health Maintenance Due Topic Date Due  Shingles Vaccine  09/14/1989  Bone Density Screening  11/14/1994 [facility] Eye Exam  03/21/2015  Glaucoma Screening   03/21/2016  Hemoglobin A1C    04/27/2017 [facility] Annual Well Visit  07/29/2017 Medicare Wellness Visit, Female The best way to live healthy is to have a healthy lifestyle by eating a well-balanced diet, exercising regularly, limiting alcohol and stopping smoking. Regular physical exams and screening tests are another way to keep healthy. Preventive exams provided by your health care provider can find health problems before they become diseases or illnesses. Preventive services including immunizations, screening tests, monitoring and exams can help you take care of your own health. All people over age 72 should have a pneumovax  and and a prevnar shot to prevent pneumonia. These are once in a lifetime unless you and your provider decide differently. All people over 65 should have a yearly flu shot and a tetanus vaccine every 10 years. A bone mass density to screen for osteoporosis or thinning of the bones should be done every 2 years after 65. Screening for diabetes mellitus with a blood sugar test should be done every year.  
 
Glaucoma is a disease of the eye due to increased ocular pressure that can lead to blindness and it should be done every year by an eye professional. 
 
 Cardiovascular screening tests that check for elevated lipids (fatty part of blood) which can lead to heart disease and strokes should be done every 5 years. Colorectal screening that evaluates for blood or polyps in your colon should be done yearly as a stool test or every five years as a flexible sigmoidoscope or every 10 years as a colonoscopy up to age 76. Breast cancer screening with a mammogram is recommended biennially  for women age 54-69. Screening for cervical cancer with a pap smear and pelvic exam is recommended for women after age 72 years every 2 years up to age 79 or when the provider and patient decide to stop. If there is a history of cervical abnormalities or other increased risk for cancer then the test is recommended yearly. Hepatitis C screening is also recommended for anyone born between 80 through [address]. A shingles vaccine is also recommended once in a lifetime after age 61. Your Medicare Wellness Exam is recommended annually. Here is a list of your current Health Maintenance items with a due date: 
Health Maintenance Due Topic Date Due  Shingles Vaccine  09/14/1989  Bone Density Screening  11/14/1994 [facility] Eye Exam  03/21/2015  Glaucoma Screening   03/21/2016  Hemoglobin A1C    04/27/2017 [facility] Annual Well Visit  07/29/2017 Patient Instructions History Introducing [facility]! [facility] introduces [employer] patient portal. Now you can access parts of your medical record, email your doctor's office, and request medication refills online. 1. In your internet browser, go to [email or web address] 2. Click on the First Time User? Click Here link in the Sign In box. You will see the New Member Sign Up page. 3. Enter your [employer] Access Code exactly as it appears below. You will not need to use this code after youve completed the sign-up process. If you do not sign up before the expiration date, you must request a new code. · [employer] Access Code: [email or web address] Expires: 12/18/2017 11:50 AM 
 
4. Enter the last four digits of your Social Security Number (xxxx) and Date of Birth (mm/dd/yyyy) as indicated and click Submit. You will be taken to the next sign-up page. 5. Create a [employer] Engine ID. This will be your [employer] login ID and cannot be changed, so think of one that is secure and easy to remember. 6. Create a [employer] password. You can change your password at any time. 7. Enter your Password Reset Question and Answer. This can be used at a later time if you forget your password. 8. Enter your e-mail address. You will receive e-mail notification when new information is available in [address] 9. Click Sign Up. You can now view and download portions of your medical record. 10. Click the Download Summary menu link to download a portable copy of your medical information. If you have questions, please visit the Frequently Asked Questions section of the [employer] website. Remember, [employer] is NOT to be used for urgent needs. For medical emergencies, dial 911. Now available from your iPhone and Android! Please provide this summary of care documentation to your next provider. Your primary care clinician is listed as Jimi Funk. If you have any questions after today's visit, please call [phone].

## 2017-09-20 LAB
BUN SERPL-MCNC: 30 MG/DL (ref 8–27)
BUN/CREAT SERPL: 19 (ref 12–28)
CALCIUM SERPL-MCNC: 10.1 MG/DL (ref 8.7–10.3)
CHLORIDE SERPL-SCNC: 107 MMOL/L (ref 96–106)
CHOLEST SERPL-MCNC: 205 MG/DL (ref 100–199)
CO2 SERPL-SCNC: 23 MMOL/L (ref 18–29)
CREAT SERPL-MCNC: 1.58 MG/DL (ref 0.57–1)
EST. AVERAGE GLUCOSE BLD GHB EST-MCNC: 105 MG/DL
GLUCOSE SERPL-MCNC: 109 MG/DL (ref 65–99)
HBA1C MFR BLD: 5.3 % (ref 4.8–5.6)
HDLC SERPL-MCNC: 72 MG/DL
INTERPRETATION: NORMAL
LDLC SERPL CALC-MCNC: 116 MG/DL (ref 0–99)
POTASSIUM SERPL-SCNC: 5.1 MMOL/L (ref 3.5–5.2)
SODIUM SERPL-SCNC: 145 MMOL/L (ref 134–144)
TRIGL SERPL-MCNC: 85 MG/DL (ref 0–149)
VLDLC SERPL CALC-MCNC: 17 MG/DL (ref 5–40)

## 2017-10-02 DIAGNOSIS — R73.02 IGT (IMPAIRED GLUCOSE TOLERANCE): Primary | ICD-10-CM

## 2017-10-16 NOTE — TELEPHONE ENCOUNTER
Mrs Cas Francis [facility] [place]

## 2017-10-17 RX ORDER — LANOLIN ALCOHOL/MO/W.PET/CERES
CREAM (GRAM) TOPICAL
Qty: 180 TAB | Refills: 3 | Status: SHIPPED | OUTPATIENT
Start: 2017-10-17 | End: 2018-07-03 | Stop reason: SDUPTHER

## 2017-12-21 ENCOUNTER — OFFICE VISIT (OUTPATIENT)
Dept: FAMILY MEDICINE CLINIC | Age: 82
End: 2017-12-21

## 2017-12-21 VITALS
BODY MASS INDEX: 28.77 KG/M2 | HEART RATE: 68 BPM | HEIGHT: 63 IN | DIASTOLIC BLOOD PRESSURE: 54 MMHG | TEMPERATURE: 98.4 F | WEIGHT: 162.4 LBS | RESPIRATION RATE: 16 BRPM | SYSTOLIC BLOOD PRESSURE: 149 MMHG

## 2017-12-21 DIAGNOSIS — R73.02 IGT (IMPAIRED GLUCOSE TOLERANCE): Primary | ICD-10-CM

## 2017-12-21 DIAGNOSIS — Z23 ENCOUNTER FOR IMMUNIZATION: ICD-10-CM

## 2017-12-21 DIAGNOSIS — I63.9 CEREBROVASCULAR ACCIDENT (CVA), UNSPECIFIED MECHANISM (HCC): ICD-10-CM

## 2017-12-21 DIAGNOSIS — E78.00 HYPERCHOLESTEROLEMIA: ICD-10-CM

## 2017-12-21 DIAGNOSIS — I73.9 PAD (PERIPHERAL ARTERY DISEASE) (HCC): ICD-10-CM

## 2017-12-21 DIAGNOSIS — I10 ESSENTIAL HYPERTENSION: ICD-10-CM

## 2017-12-21 RX ORDER — CILOSTAZOL 50 MG/1
TABLET ORAL
Qty: 180 TAB | Refills: 3 | Status: SHIPPED | OUTPATIENT
Start: 2017-12-21 | End: 2018-04-26 | Stop reason: SDUPTHER

## 2017-12-21 NOTE — PROGRESS NOTES
Lucho Hernandez is a 80 y.o. female presenting for/with:    Hypertension; Cholesterol Problem; and Follow-up    HPI:  No recent falls. Strength is better since last visit. Hypertension. Blood pressures have been stable. Management at last visit included continuing current regimen . Current regimen: angiotensin II receptor antagonist and thiazide diuretic. Symptoms include no sx. Patient denies chest pain, palpitations, peripheral edema, headache, blurred vision. Lab review:   Lab Results   Component Value Date/Time    Sodium 145 09/19/2017 11:43 AM    Potassium 5.1 09/19/2017 11:43 AM    Chloride 107 09/19/2017 11:43 AM    CO2 23 09/19/2017 11:43 AM    Anion gap 8 03/14/2017 01:30 PM    Glucose 109 09/19/2017 11:43 AM    BUN 30 09/19/2017 11:43 AM    Creatinine 1.58 09/19/2017 11:43 AM    BUN/Creatinine ratio 19 09/19/2017 11:43 AM    GFR est AA 34 09/19/2017 11:43 AM    GFR est non-AA 29 09/19/2017 11:43 AM    Calcium 10.1 09/19/2017 11:43 AM     Hyperlipidemia and PAD. On zetia and pletal. Ritika well. No myalgias, arthralgias, unusual weakness. Lab Results   Component Value Date/Time    Cholesterol, total 205 09/19/2017 11:43 AM    HDL Cholesterol 72 09/19/2017 11:43 AM    LDL, calculated 116 09/19/2017 11:43 AM    VLDL, calculated 17 09/19/2017 11:43 AM    Triglyceride 85 09/19/2017 11:43 AM     Lab Results   Component Value Date/Time    ALT (SGPT) 11 03/14/2017 01:30 PM    AST (SGOT) 16 03/14/2017 01:30 PM    Alk. phosphatase 75 03/14/2017 01:30 PM    Bilirubin, total 0.6 03/14/2017 01:30 PM     PMH, SH, Medications/Allergies: reviewed, on chart.     ROS:  Constitutional: No fever, chills or weight loss  Respiratory: No cough, SOB   CV: No chest pain or Palpitations    Visit Vitals    /54 (BP 1 Location: Left arm, BP Patient Position: Sitting)    Pulse 68    Temp 98.4 °F (36.9 °C) (Oral)    Resp 16    Ht 5' 3\" (1.6 m)    Wt 162 lb 6.4 oz (73.7 kg)    BMI 28.77 kg/m2     Wt Readings from Last 3 Encounters:   12/21/17 162 lb 6.4 oz (73.7 kg)   09/19/17 159 lb (72.1 kg)   04/04/17 157 lb 6.4 oz (71.4 kg)     BP Readings from Last 3 Encounters:   12/21/17 149/54   09/19/17 158/70   04/04/17 120/54     Physical Examination: General appearance - alert, well appearing, and in no distress  Mental status - alert, oriented to person, place, and time  Eyes - pupils equal and reactive, extraocular eye movements intact  ENT - bilateral external ears and nose normal. Normal lips  Neck - supple, no significant adenopathy, no thyromegaly or mass  Lymphatics - no palpable lymphadenopathy, no hepatosplenomegaly  Chest - clear to auscultation, no wheezes, rales or rhonchi, symmetric air entry  Heart - normal rate, regular rhythm, normal S1, S2, no murmurs, rubs, clicks or gallops  Extremities - peripheral pulses normal, no pedal edema, no clubbing or cyanosis    A/P  HTN  A little better. Con't current tx. BP checks. If climbing in future, plan add norvasc 2.5mg qd    HLD and presumed PAD  well controlled. con't current tx. IGT  A1c ok.   Lab Results   Component Value Date/Time    Hemoglobin A1c 5.3 09/19/2017 11:43 AM     F/u 6mo/PRN

## 2017-12-21 NOTE — MR AVS SNAPSHOT
Visit Information Date & Time Provider Department Dept. Phone Encounter #  
 12/21/2017 10:50 AM Idelle Felty, MD [address] [phone] [id number] Upcoming Health Maintenance Date Due ZOSTER VACCINE AGE 60> 9/14/1989 OSTEOPOROSIS SCREENING (DEXA) 11/14/1994 Pneumococcal 65+ Low/Medium Risk (2 of 2 - PCV13) 10/7/2014 EYE EXAM RETINAL OR DILATED Q1 3/21/2015 GLAUCOMA SCREENING Q2Y 3/21/2016 Influenza Age 5 to Adult 8/1/2017 HEMOGLOBIN A1C Q6M 3/19/2018 FOOT EXAM Q1 9/19/2018 MICROALBUMIN Q1 9/19/2018 LIPID PANEL Q1 9/19/2018 MEDICARE YEARLY EXAM 9/20/2018 DTaP/Tdap/Td series (2 - Td) 9/19/2027 Allergies as of 12/21/2017  Review Complete On: 12/21/2017 By: Rell Petty LPN Severity Noted Reaction Type Reactions Pcn [Penicillins]  07/12/2013    Unknown (comments) Current Immunizations  Reviewed on 12/21/2017 Name Date Influenza High Dose Vaccine PF  Incomplete Influenza Vaccine 9/23/2014, 10/7/2013 Influenza Vaccine [facility]) 10/7/2015 Pneumococcal Polysaccharide (PPSV-23) 10/7/2013 Reviewed by Rell Petty LPN on 92/60/3123 at 10:51 AM  
 Reviewed by Rell Petty LPN on 83/42/6930 at 10:51 AM  
You Were Diagnosed With   
  
 Codes Comments IGT (impaired glucose tolerance)    -  Primary ICD-10-CM: R73.02 
ICD-9-CM: 790.22 Essential hypertension     ICD-10-CM: I10 
ICD-9-CM: 401.9 Hypercholesterolemia     ICD-10-CM: E78.00 ICD-9-CM: 272.0 Cerebrovascular accident (CVA), unspecified mechanism ([facility] [address]     ICD-10-CM: I63.9 ICD-9-CM: 434.91   
 PAD (peripheral artery disease) (HCC)     ICD-10-CM: I73.9 ICD-9-CM: 443.9 Encounter for immunization     ICD-10-CM: H54 ICD-9-CM: V03.89 Vitals BP Pulse Temp Resp Height(growth percentile) Weight(growth percentile)  149/54 (BP 1 Location: Left arm, BP Patient Position: Sitting) 68 98.4 °F (36.9 °C) (Oral) 16 5' 3\" (1.6 m) 162 lb 6.4 oz (73.7 kg) BMI OB Status Smoking Status 28.77 kg/m2 Postmenopausal Former Smoker BMI and BSA Data Body Mass Index Body Surface Area 28.77 kg/m 2 1.81 m 2 Preferred Pharmacy Pharmacy Name Phone [address] [phone] Your Updated Medication List  
  
   
This list is accurate as of: 12/21/17 11:14 AM.  Always use your most recent med list.  
  
  
  
  
 aspirin delayed-release 81 mg tablet Take  by mouth daily. cilostazol 50 mg tablet Commonly known as:  PLETAL  
TAKE 1 TABLET BY MOUTH DAILY BEFORE BREAKFAST AND DINNER  Indications: cramps  
  
 ezetimibe 10 mg tablet Commonly known as:  Buel Mace Take 1 Tab by mouth daily. Indications: cholesterol and heart  
  
 ferrous sulfate 325 mg (65 mg iron) tablet Commonly known as:  Iron TAKE ONE TABLET BY MOUTH TWICE DAILY WITH MEALS  
  
 glucose blood VI test strips strip Commonly known as:  ASCENSIA AUTODISC VI, ONE TOUCH ULTRA TEST VI  
TRUE MATRIX. DX R73.02, use to check sugar monthly and as needed for symptoms of low or high sugar LUMIGAN 0.03 % ophthalmic drops Generic drug:  bimatoprost  
Administer 1 Drop to both eyes every evening.  
  
 multivitamin tablet Commonly known as:  ONE A DAY Take 1 Tab by mouth daily. olmesartan-hydroCHLOROthiazide 20-12.5 mg per tablet Commonly known as:  BENICAR HCT  
TAKE 1 TABLET BY MOUTH DAILY FOR BLOOD PRESSURE  
  
 omeprazole 40 mg capsule Commonly known as:  PRILOSEC  
TAKE 1 CAPSULE BY MOUTH DAILY  
  
 timolol maleate 0.5 % Drpd ophthalmic solution Administer 1 Drop to both eyes daily. Prescriptions Sent to Pharmacy Refills  
 cilostazol (PLETAL) 50 mg tablet 3 Sig: TAKE 1 TABLET BY MOUTH DAILY BEFORE BREAKFAST AND DINNER  Indications: cramps  Class: Normal  
 Pharmacy: [address] [place] [address], [address] [place] [address]. #1  #: [phone] We Performed the Following ADMIN INFLUENZA VIRUS VAC [ [facility]] INFLUENZA VIRUS VACCINE, HIGH DOSE SEASONAL, PRESERVATIVE FREE [22743 CPT(R)] Introducing [facility] SERVICES! Calderon Carcamo introduces [employer] patient portal. Now you can access parts of your medical record, email your doctor's office, and request medication refills online. 1. In your internet browser, go to [email or web address] 2. Click on the First Time User? Click Here link in the Sign In box. You will see the New Member Sign Up page. 3. Enter your [employer] Access Code exactly as it appears below. You will not need to use this code after youve completed the sign-up process. If you do not sign up before the expiration date, you must request a new code. · [employer] Access Code: [email or web address] Expires: 3/21/2018 11:14 AM 
 
4. Enter the last four digits of your Social Security Number (xxxx) and Date of Birth (mm/dd/yyyy) as indicated and click Submit. You will be taken to the next sign-up page. 5. Create a [employer] ID. This will be your [employer] login ID and cannot be changed, so think of one that is secure and easy to remember. 6. Create a [employer] password. You can change your password at any time. 7. Enter your Password Reset Question and Answer. This can be used at a later time if you forget your password. 8. Enter your e-mail address. You will receive e-mail notification when new information is available in [address] 9. Click Sign Up. You can now view and download portions of your medical record. 10. Click the Download Summary menu link to download a portable copy of your medical information. If you have questions, please visit the Frequently Asked Questions section of the [employer] website. Remember, [employer] is NOT to be used for urgent needs. For medical emergencies, dial 911. Now available from your iPhone and Android! Please provide this summary of care documentation to your next provider. Your primary care clinician is listed as Enriqueta Kim. If you have any questions after today's visit, please call [phone].

## 2018-01-29 ENCOUNTER — TELEPHONE (OUTPATIENT)
Dept: FAMILY MEDICINE CLINIC | Age: 83
End: 2018-01-29

## 2018-01-29 NOTE — TELEPHONE ENCOUNTER
Caregiver asks if Benicar has been discontinued. The pharmacy noted that patient has not been getting this medication refilled.

## 2018-01-30 NOTE — TELEPHONE ENCOUNTER
Spoke with caregiver. Patient has been out of med since November stated that provider advised her to stop medication. Her Bp checks have been good today reading was 137 / 75. Should they refill med ?

## 2018-01-30 NOTE — TELEPHONE ENCOUNTER
Called to advise caregiver Km Lozano patient does not need to take medication she had already left for the day I have advised patient that she does  Not need to take med but I will call back again tomorrow to be sure Km Mcdonald received message.

## 2018-02-09 ENCOUNTER — TELEPHONE (OUTPATIENT)
Dept: FAMILY MEDICINE CLINIC | Age: 83
End: 2018-02-09

## 2018-02-09 NOTE — TELEPHONE ENCOUNTER
Spoke with patient. Caregiver unavailable to come to phone. Patient has asked that we call her back on Monday since she has already left for the day.

## 2018-02-12 ENCOUNTER — TELEPHONE (OUTPATIENT)
Dept: FAMILY MEDICINE CLINIC | Age: 83
End: 2018-02-12

## 2018-02-12 NOTE — TELEPHONE ENCOUNTER
----- Message from Arturo Burks sent at 2/9/2018  1:08 PM EST -----  Regarding: /Telephone  Pt wanted to know if she is still not taking the Benicar. Best contact number is [phone].

## 2018-02-12 NOTE — TELEPHONE ENCOUNTER
Namita already spoken to caregiver Makayla Oneil advised to hold medication see note from earlier today.

## 2018-02-12 NOTE — TELEPHONE ENCOUNTER
Spoke with caregiver Sasha Leo. Confirmed that patient is to stay off Benicar still per Debbe Domingo Np.

## 2018-03-27 ENCOUNTER — TELEPHONE (OUTPATIENT)
Dept: FAMILY MEDICINE CLINIC | Age: 83
End: 2018-03-27

## 2018-03-27 NOTE — TELEPHONE ENCOUNTER
Pt wants to be set up for an appt due to feeling sluggish and no appetite. Patient prefers Dr. Eli Johnson but will see another provider if he is busy. Patient has Transportation that needs atleast 2 days in advance notice to set up.

## 2018-03-28 NOTE — TELEPHONE ENCOUNTER
Appointment scheduled with Dr Ruben Vaughn @ 220 pm on 4-5-18. Caregiver advised of appointment time and date. She will discuss with patient and call us back if that is not convenient for her.

## 2018-04-05 DIAGNOSIS — E78.5 HYPERLIPIDEMIA, UNSPECIFIED HYPERLIPIDEMIA TYPE: ICD-10-CM

## 2018-04-05 RX ORDER — EZETIMIBE 10 MG/1
TABLET ORAL
Qty: 30 TAB | Refills: 11 | Status: SHIPPED | OUTPATIENT
Start: 2018-04-05 | End: 2018-07-03 | Stop reason: SDUPTHER

## 2018-04-26 ENCOUNTER — OFFICE VISIT (OUTPATIENT)
Dept: FAMILY MEDICINE CLINIC | Age: 83
End: 2018-04-26

## 2018-04-26 VITALS
HEART RATE: 79 BPM | OXYGEN SATURATION: 96 % | BODY MASS INDEX: 27.25 KG/M2 | SYSTOLIC BLOOD PRESSURE: 112 MMHG | RESPIRATION RATE: 20 BRPM | TEMPERATURE: 97.4 F | HEIGHT: 63 IN | DIASTOLIC BLOOD PRESSURE: 60 MMHG | WEIGHT: 153.8 LBS

## 2018-04-26 DIAGNOSIS — K21.9 GASTROESOPHAGEAL REFLUX DISEASE WITHOUT ESOPHAGITIS: ICD-10-CM

## 2018-04-26 DIAGNOSIS — E78.00 HYPERCHOLESTEROLEMIA: ICD-10-CM

## 2018-04-26 DIAGNOSIS — I10 ESSENTIAL HYPERTENSION: ICD-10-CM

## 2018-04-26 DIAGNOSIS — F02.80 LATE ONSET ALZHEIMER'S DISEASE WITHOUT BEHAVIORAL DISTURBANCE (HCC): Primary | ICD-10-CM

## 2018-04-26 DIAGNOSIS — G30.1 LATE ONSET ALZHEIMER'S DISEASE WITHOUT BEHAVIORAL DISTURBANCE (HCC): Primary | ICD-10-CM

## 2018-04-26 DIAGNOSIS — I73.9 PAD (PERIPHERAL ARTERY DISEASE) (HCC): ICD-10-CM

## 2018-04-26 DIAGNOSIS — R73.02 GLUCOSE INTOLERANCE (IMPAIRED GLUCOSE TOLERANCE): ICD-10-CM

## 2018-04-26 RX ORDER — OMEPRAZOLE 40 MG/1
CAPSULE, DELAYED RELEASE ORAL
Qty: 90 CAP | Refills: 3 | Status: SHIPPED | OUTPATIENT
Start: 2018-04-26 | End: 2018-07-03 | Stop reason: SDUPTHER

## 2018-04-26 RX ORDER — CILOSTAZOL 50 MG/1
TABLET ORAL
Qty: 180 TAB | Refills: 3 | Status: SHIPPED | OUTPATIENT
Start: 2018-04-26 | End: 2018-07-03 | Stop reason: SDUPTHER

## 2018-04-26 RX ORDER — DONEPEZIL HYDROCHLORIDE 5 MG/1
5 TABLET, FILM COATED ORAL
Qty: 90 TAB | Refills: 3 | Status: SHIPPED | OUTPATIENT
Start: 2018-04-26 | End: 2018-07-03 | Stop reason: SDUPTHER

## 2018-04-26 NOTE — MR AVS SNAPSHOT
Dany Aguilera 
 
 
 [email or web address] 
[phone] Patient: Ramila Ritter MRN: [id number] LYNN:[birth year] Visit Information Date & Time Provider Department Dept. Phone Encounter #  
 4/26/2018 11:10 AM Barry Yap MD [address] [place] [address] [phone] [id number] Upcoming Health Maintenance Date Due ZOSTER VACCINE AGE 60> 9/14/1989 Bone Densitometry (Dexa) Screening 11/14/1994 Pneumococcal 65+ Low/Medium Risk (2 of 2 - PCV13) 10/7/2014 EYE EXAM RETINAL OR DILATED Q1 3/21/2015 GLAUCOMA SCREENING Q2Y 3/21/2016 HEMOGLOBIN A1C Q6M 3/19/2018 FOOT EXAM Q1 9/19/2018 MICROALBUMIN Q1 9/19/2018 LIPID PANEL Q1 9/19/2018 MEDICARE YEARLY EXAM 9/20/2018 DTaP/Tdap/Td series (2 - Td) 9/19/2027 Allergies as of 4/26/2018  Review Complete On: 4/26/2018 By: Barry Yap MD  
  
 Severity Noted Reaction Type Reactions Pcn [Penicillins]  07/12/2013    Unknown (comments) Current Immunizations  Reviewed on 12/21/2017 Name Date Influenza High Dose Vaccine PF 12/21/2017 Influenza Vaccine 9/23/2014, 10/7/2013 Influenza Vaccine [facility]) 10/7/2015 Pneumococcal Polysaccharide (PPSV-23) 10/7/2013 Not reviewed this visit You Were Diagnosed With   
  
 Codes Comments Late onset Alzheimer's disease without behavioral disturbance    -  Primary ICD-10-CM: G30.1, F02.80 ICD-9-CM: 331.0, 294.10 Essential hypertension     ICD-10-CM: I10 
ICD-9-CM: 401.9 Hypercholesterolemia     ICD-10-CM: E78.00 ICD-9-CM: 272.0 Glucose intolerance (impaired glucose tolerance)     ICD-10-CM: R73.02 
ICD-9-CM: 790.22 PAD (peripheral artery disease) (HCC)     ICD-10-CM: I73.9 ICD-9-CM: 443.9 Gastroesophageal reflux disease without esophagitis     ICD-10-CM: K21.9 ICD-9-CM: 530.81 Vitals BP Pulse Temp Resp Height(growth percentile) 112/60 (BP 1 Location: Left arm, BP Patient Position: Sitting) 79 97.4 °F (36.3 °C) (Temporal) 20 5' 3\" (1.6 m) Weight(growth percentile) SpO2 BMI OB Status Smoking Status 153 lb 12.8 oz (69.8 kg) 96% 27.24 kg/m2 Postmenopausal Former Smoker BMI and BSA Data Body Mass Index Body Surface Area  
 27.24 kg/m 2 1.76 m 2 Preferred Pharmacy Pharmacy Name Phone [address] [phone] Your Updated Medication List  
  
   
This list is accurate as of 4/26/18 12:59 PM.  Always use your most recent med list.  
  
  
  
  
 aspirin delayed-release 81 mg tablet Take  by mouth daily. cilostazol 50 mg tablet Commonly known as:  PLETAL  
TAKE 1 TABLET BY MOUTH DAILY BEFORE BREAKFAST AND DINNER  Indications: cramps  
  
 donepezil 5 mg tablet Commonly known as:  ARICEPT Take 1 Tab by mouth nightly. Indications: memory  
  
 ezetimibe 10 mg tablet Commonly known as:  Naye Cbarera TAKE 1 TABLET BY MOUTH DAILY FOR CHOLESTROL AND HEART  
  
 ferrous sulfate 325 mg (65 mg iron) tablet Commonly known as:  Iron TAKE ONE TABLET BY MOUTH TWICE DAILY WITH MEALS  
  
 glucose blood VI test strips strip Commonly known as:  ASCENSIA AUTODISC VI, ONE TOUCH ULTRA TEST VI  
TRUE MATRIX. DX R73.02, use to check sugar monthly and as needed for symptoms of low or high sugar LUMIGAN 0.03 % ophthalmic drops Generic drug:  bimatoprost  
Administer 1 Drop to both eyes every evening.  
  
 multivitamin tablet Commonly known as:  ONE A DAY Take 1 Tab by mouth daily. omeprazole 40 mg capsule Commonly known as:  PRILOSEC  
TAKE 1 CAPSULE BY MOUTH DAILY  
  
 timolol maleate 0.5 % Drpd ophthalmic solution Administer 1 Drop to both eyes daily. Prescriptions Sent to Pharmacy Refills  
 donepezil (ARICEPT) 5 mg tablet 3 Sig: Take 1 Tab by mouth nightly. Indications: memory  Class: Normal  
 Pharmacy: [address] [place] [address]. #1  #: [phone] Route: Oral  
 cilostazol (PLETAL) 50 mg tablet 3 Sig: TAKE 1 TABLET BY MOUTH DAILY BEFORE BREAKFAST AND DINNER  Indications: cramps Class: Normal  
 Pharmacy: [address] [place] [address]. #1 Ph #: [phone]  
 omeprazole (PRILOSEC) 40 mg capsule 3 Sig: TAKE 1 CAPSULE BY MOUTH DAILY Class: Normal  
 Pharmacy: [address] [place] [address]. #1 Ph #: [phone] Introducing [facility]! [facility] introduces [employer] patient portal. Now you can access parts of your medical record, email your doctor's office, and request medication refills online. 1. In your internet browser, go to [email or web address] 2. Click on the First Time User? Click Here link in the Sign In box. You will see the New Member Sign Up page. 3. Enter your [employer] Access Code exactly as it appears below. You will not need to use this code after youve completed the sign-up process. If you do not sign up before the expiration date, you must request a new code. · [employer] Access Code: [email or web address] Expires: 7/25/2018 12:59 PM 
 
4. Enter the last four digits of your Social Security Number (xxxx) and Date of Birth (mm/dd/yyyy) as indicated and click Submit. You will be taken to the next sign-up page. 5. Create a Perfect Chann[employer] ID. This will be your [employer] login ID and cannot be changed, so think of one that is secure and easy to remember. 6. Create a [employer] password. You can change your password at any time. 7. Enter your Password Reset Question and Answer. This can be used at a later time if you forget your password. 8. Enter your e-mail address. You will receive e-mail notification when new information is available in [address] 9. Click Sign Up. You can now view and download portions of your medical record. 10. Click the Download Summary menu link to download a portable copy of your medical information. If you have questions, please visit the Frequently Asked Questions section of the [employer] website. Remember, [employer] is NOT to be used for urgent needs. For medical emergencies, dial 911. Now available from your iPhone and Android! Please provide this summary of care documentation to your next provider. Your primary care clinician is listed as Rojelio Bell. If you have any questions after today's visit, please call [phone].

## 2018-04-26 NOTE — PROGRESS NOTES
1. Have you been to the ER, urgent care clinic since your last visit? Hospitalized since your last visit? No    2. Have you seen or consulted any other health care providers outside of the [facility] since your last visit? Include any pap smears or colon screening.  No

## 2018-04-26 NOTE — PROGRESS NOTES
Madisyn Arambula is a 80 y.o. female presenting for/with:    Fall (x3 Since Last Visit.) and Decreased Appetite    HPI:  More falls lately. No serious injury though. Memory gradually worsening. Strength is about the same since last visit. Using walker regularly now. Hypertension. Blood pressures have been stable. Management at last visit included continuing current regimen . Current regimen: angiotensin II receptor antagonist and thiazide diuretic. Symptoms include no sx. Patient denies chest pain, palpitations, peripheral edema, headache, blurred vision. Lab review:   Lab Results   Component Value Date/Time    Sodium 145 (H) 09/19/2017 11:43 AM    Potassium 5.1 09/19/2017 11:43 AM    Chloride 107 (H) 09/19/2017 11:43 AM    CO2 23 09/19/2017 11:43 AM    Anion gap 8 03/14/2017 01:30 PM    Glucose 109 (H) 09/19/2017 11:43 AM    BUN 30 (H) 09/19/2017 11:43 AM    Creatinine 1.58 (H) 09/19/2017 11:43 AM    BUN/Creatinine ratio 19 09/19/2017 11:43 AM    GFR est AA 34 (L) 09/19/2017 11:43 AM    GFR est non-AA 29 (L) 09/19/2017 11:43 AM    Calcium 10.1 09/19/2017 11:43 AM     Hyperlipidemia and PAD. On zetia and pletal. Ritika well. No myalgias, arthralgias, unusual weakness. Lab Results   Component Value Date/Time    Cholesterol, total 205 (H) 09/19/2017 11:43 AM    HDL Cholesterol 72 09/19/2017 11:43 AM    LDL, calculated 116 (H) 09/19/2017 11:43 AM    VLDL, calculated 17 09/19/2017 11:43 AM    Triglyceride 85 09/19/2017 11:43 AM     Lab Results   Component Value Date/Time    ALT (SGPT) 11 (L) 03/14/2017 01:30 PM    AST (SGOT) 16 03/14/2017 01:30 PM    Alk. phosphatase 75 03/14/2017 01:30 PM    Bilirubin, total 0.6 03/14/2017 01:30 PM     PMH, SH, Medications/Allergies: reviewed, on chart.     ROS:  Constitutional: No fever, chills or weight loss  Respiratory: No cough, SOB   CV: No chest pain or Palpitations    Visit Vitals    /60 (BP 1 Location: Left arm, BP Patient Position: Sitting)    Pulse 79    Temp 97.4 °F (36.3 °C) (Temporal)    Resp 20    Ht 5' 3\" (1.6 m)    Wt 153 lb 12.8 oz (69.8 kg)    SpO2 96%    BMI 27.24 kg/m2     Wt Readings from Last 3 Encounters:   04/26/18 153 lb 12.8 oz (69.8 kg)   12/21/17 162 lb 6.4 oz (73.7 kg)   09/19/17 159 lb (72.1 kg)   -9#  BP Readings from Last 3 Encounters:   04/26/18 112/60   12/21/17 149/54   09/19/17 158/70     Physical Examination: General appearance - alert, well appearing, and in no distress  Mental status - alert, oriented to person, place, and time  Eyes - pupils equal and reactive, extraocular eye movements intact  ENT - bilateral external ears and nose normal. Normal lips  Neck - supple, no significant adenopathy, no thyromegaly or mass  Lymphatics - no palpable lymphadenopathy, no hepatosplenomegaly  Chest - clear to auscultation, no wheezes, rales or rhonchi, symmetric air entry  Heart - normal rate, regular rhythm, normal S1, S2, no murmurs, rubs, clicks or gallops  Extremities - peripheral pulses normal, no pedal edema, no clubbing or cyanosis  Neuro- Mini-cog: Abn clock, 0/3 recall. A/P  Weakness and falls  BP is ok, SaO2 is ok, and exam is benign. Memory gradually worsening c/w SDAT. Try adding aricept 5mg daily. HTN  Much better now, off all meds. Probably due to wt loss. Stay off benicar HCT. If climbing in future, plan r/s plain benicar. HLD and PAD  well controlled. con't current tx. IGT  A1c ok. Weight down. Probably don't need recheck if wt <170#.    Lab Results   Component Value Date/Time    Hemoglobin A1c 5.3 09/19/2017 11:43 AM     F/u 2mo/PRN

## 2018-04-26 NOTE — PATIENT INSTRUCTIONS
Alzheimer's Disease: Care Instructions  Your Care Instructions    Alzheimer's disease is a type of dementia. It causes memory loss and affects judgment, language, and behavior. You may have trouble making decisions or may get lost in places that you used to know well. Alzheimer's disease is different than mild memory loss that occurs with aging. It is not clear what causes Alzheimer's disease, but it is the most common form of dementia in older adults. Finding out that you have this disease is a shock. You may be afraid and worried about how the condition will change your life. Although there is no cure at this time, medicine in some cases may slow memory loss for a while. Other medicines may be able to help you sleep or cope with depression and behavior changes. Alzheimer's disease is different for everyone. It may take many years to develop. In some cases, people can function well for a long time. In the early stage of the disease, you can do things at home to make life easier and safer. You also can keep doing your hobbies and other activities. Many people find comfort in planning now for their future needs. Follow-up care is a key part of your treatment and safety. Be sure to make and go to all appointments, and call your doctor if you are having problems. It's also a good idea to know your test results and keep a list of the medicines you take. How can you care for yourself at home? Taking care of yourself  · If your doctor gives you medicines, take them exactly as prescribed. Call your doctor if you think you are having a problem with your medicine. You will get more details on the medicines your doctor prescribes. · Eat a balanced diet. Get plenty of whole grains, fruits, and vegetables every day. If you are not hungry at mealtimes, eat snacks at midmorning and in the afternoon. Try drinks such as Boost, Ensure, or Sustacal if you are having trouble keeping your weight up. · Stay active.  Exercise such as walking may slow the decline of your mental abilities. Try to stay active mentally too. Read and work crossword puzzles if you enjoy these activities. · If you have trouble sleeping, do not nap during the day. Get regular exercise (but not within several hours of bedtime). Drink a glass of warm milk or caffeine-free herbal tea before going to bed. · Ask your doctor about support groups and other resources in your area. They can help people who have Alzheimer's disease and their families. · Be patient. You may find that a task takes you longer than it used to. · If you have not already done so, make a list of advance directives. Advance directives are instructions to your doctor and family members about what kind of care you want if you become unable to speak or express yourself. Talk to a  about making a will, if you do not already have one. Keeping schedules  · Develop a routine. You will feel less frustrated or confused if you have a clear, simple plan of what to do every day. ¨ Make lists of your medicines and when to take them. ¨ Write down appointments and other tasks in a calendar. ¨ Put sticky notes around the house to help you remember events and other things you have to do. ¨ Schedule activities and tasks for times of the day when you are best able to handle them. Staying safe  · Tell someone when you are going out and where you are going. Let the person know when you will be back. Before you go out alone, write down where you are going, how to get there, and how to get back home. Do this even if you have gone there many times before. Take someone along with you when possible. · Make your home safe. Tack down rugs, put no-slip tape in the tub, use handrails, and put safety switches on stoves and appliances. · Have a family member or other caregiver tell you whether you are driving badly. Deciding to stop driving is very hard for many people. Driving helps you feel independent.  Your state 's license bureau can do a driving test if there is any question. Plan for other means of getting around when you are no longer able to drive. · Use strong lighting, especially at night. Put night-lights in bedrooms, hallways, and bathrooms. · Lower the hot water temperature setting to 120°F or lower to avoid burns. When should you call for help? Call 911 anytime you think you may need emergency care. For example, call if:  ? · You are lost and do not know whom to call. ? · You are injured and do not know whom to call. ?Call your doctor now or seek immediate medical care if:  ? · Your symptoms suddenly get much worse. ? Watch closely for changes in your health, and be sure to contact your doctor if:  ? · You want more information about how you can take care of yourself. Where can you learn more? Go to http://ilsa-daniel.info/. Enter Y179 in the search box to learn more about \"Alzheimer's Disease: Care Instructions. \"  Current as of: May 12, 2017  Content Version: 11.4  © 7457-2323 [employer]wise, Incorporated. Care instructions adapted under license by [employer] (which disclaims liability or warranty for this information). If you have questions about a medical condition or this instruction, always ask your healthcare professional. [address] any warranty or liability for your use of this information.

## 2018-04-30 ENCOUNTER — TELEPHONE (OUTPATIENT)
Dept: FAMILY MEDICINE CLINIC | Age: 83
End: 2018-04-30

## 2018-04-30 NOTE — TELEPHONE ENCOUNTER
Spoke with pharmacist to confirm patient is no longer taking Benicar. Dr Madeline Castillo did stop the medication at last visit.

## 2018-05-01 ENCOUNTER — TELEPHONE (OUTPATIENT)
Dept: FAMILY MEDICINE CLINIC | Age: 83
End: 2018-05-01

## 2018-05-01 NOTE — TELEPHONE ENCOUNTER
Spoke with caregiver. Advised ok to take Glucerna as directed for decreased appetite. Call office for any concerns.

## 2018-05-01 NOTE — TELEPHONE ENCOUNTER
Pts aide called chong wanted to speak with Aly Lew (JESS) about Ms. Dewey. She has been feeling tired with no appetite due to diabetes she believes. She wanted to know could she give her any juice or something to bring her back up.  Call [phone]

## 2018-07-03 DIAGNOSIS — I73.9 PAD (PERIPHERAL ARTERY DISEASE) (HCC): ICD-10-CM

## 2018-07-03 DIAGNOSIS — E78.5 HYPERLIPIDEMIA, UNSPECIFIED HYPERLIPIDEMIA TYPE: ICD-10-CM

## 2018-07-03 DIAGNOSIS — K21.9 GASTROESOPHAGEAL REFLUX DISEASE WITHOUT ESOPHAGITIS: ICD-10-CM

## 2018-07-03 DIAGNOSIS — G30.1 LATE ONSET ALZHEIMER'S DISEASE WITHOUT BEHAVIORAL DISTURBANCE (HCC): ICD-10-CM

## 2018-07-03 DIAGNOSIS — F02.80 LATE ONSET ALZHEIMER'S DISEASE WITHOUT BEHAVIORAL DISTURBANCE (HCC): ICD-10-CM

## 2018-07-03 RX ORDER — OMEPRAZOLE 40 MG/1
CAPSULE, DELAYED RELEASE ORAL
Qty: 90 CAP | Refills: 3 | Status: SHIPPED | OUTPATIENT
Start: 2018-07-03 | End: 2019-08-07 | Stop reason: SDUPTHER

## 2018-07-03 RX ORDER — CILOSTAZOL 50 MG/1
TABLET ORAL
Qty: 180 TAB | Refills: 3 | Status: SHIPPED | OUTPATIENT
Start: 2018-07-03 | End: 2019-08-07 | Stop reason: SDUPTHER

## 2018-07-03 RX ORDER — DONEPEZIL HYDROCHLORIDE 5 MG/1
5 TABLET, FILM COATED ORAL
Qty: 90 TAB | Refills: 3 | Status: SHIPPED | OUTPATIENT
Start: 2018-07-03 | End: 2019-07-06 | Stop reason: SDUPTHER

## 2018-07-03 RX ORDER — EZETIMIBE 10 MG/1
TABLET ORAL
Qty: 90 TAB | Refills: 3 | Status: SHIPPED | OUTPATIENT
Start: 2018-07-03 | End: 2019-07-06 | Stop reason: SDUPTHER

## 2018-07-03 RX ORDER — LANOLIN ALCOHOL/MO/W.PET/CERES
CREAM (GRAM) TOPICAL
Qty: 180 TAB | Refills: 3 | Status: SHIPPED | OUTPATIENT
Start: 2018-07-03 | End: 2019-07-31 | Stop reason: SDUPTHER

## 2018-08-27 ENCOUNTER — OFFICE VISIT (OUTPATIENT)
Dept: FAMILY MEDICINE CLINIC | Age: 83
End: 2018-08-27

## 2018-08-27 VITALS
DIASTOLIC BLOOD PRESSURE: 70 MMHG | SYSTOLIC BLOOD PRESSURE: 150 MMHG | HEART RATE: 63 BPM | BODY MASS INDEX: 26.22 KG/M2 | TEMPERATURE: 96.1 F | OXYGEN SATURATION: 95 % | HEIGHT: 63 IN | RESPIRATION RATE: 20 BRPM | WEIGHT: 148 LBS

## 2018-08-27 NOTE — MR AVS SNAPSHOT
[address] [facility] 
 
 
 [address] 
[phone] Patient: Hollis Castellanos MRN: [id number] QRP:[id number] Visit Information Date & Time Provider Department Dept. Phone Encounter #  
 8/27/2018 10:30 AM Poli Rangel NP [facility] [address] [facility] [phone] [id number] Upcoming Health Maintenance Date Due ZOSTER VACCINE AGE 60> 9/14/1989 Bone Densitometry (Dexa) Screening 11/14/1994 Pneumococcal 65+ Low/Medium Risk (2 of 2 - PCV13) 10/7/2014 EYE EXAM RETINAL OR DILATED Q1 3/21/2015 GLAUCOMA SCREENING Q2Y 3/21/2016 HEMOGLOBIN A1C Q6M 3/19/2018 Influenza Age 5 to Adult 8/1/2018 MICROALBUMIN Q1 9/19/2018 LIPID PANEL Q1 9/19/2018 MEDICARE YEARLY EXAM 9/20/2018 FOOT EXAM Q1 6/4/2019 DTaP/Tdap/Td series (2 - Td) 9/19/2027 Allergies as of 8/27/2018  Review Complete On: 8/27/2018 By: Iker Anderson RN Severity Noted Reaction Type Reactions Pcn [Penicillins]  07/12/2013    Unknown (comments) Current Immunizations  Reviewed on 12/21/2017 Name Date Influenza High Dose Vaccine PF 12/21/2017 Influenza Vaccine 9/23/2014, 10/7/2013 Influenza Vaccine Rema Bible) 10/7/2015 Pneumococcal Polysaccharide (PPSV-23) 10/7/2013 Not reviewed this visit You Were Diagnosed With   
  
 Codes Comments BMI 26.0-26.9,adult    -  Primary ICD-10-CM: A08.28 
ICD-9-CM: V85.22 Vitals BP Pulse Temp Resp Height(growth percentile) 150/70 (BP 1 Location: Left arm, BP Patient Position: Sitting) 63 96.1 °F (35.6 °C) (Temporal) 20 5' 3\" (1.6 m) Weight(growth percentile) SpO2 BMI OB Status Smoking Status 148 lb (67.1 kg) 95% 26.22 kg/m2 Postmenopausal Former Smoker BMI and BSA Data Body Mass Index Body Surface Area  
 26.22 kg/m 2 1.73 m 2 Preferred Pharmacy Pharmacy Name Phone  [facility], [place] - [address] [phone] Your Updated Medication List  
  
   
This list is accurate as of 8/27/18 11:34 AM.  Always use your most recent med list.  
  
  
  
  
 aspirin delayed-release 81 mg tablet Take  by mouth daily. cilostazol 50 mg tablet Commonly known as:  PLETAL  
TAKE 1 TABLET BY MOUTH DAILY BEFORE BREAKFAST AND DINNER  Indications: cramps  
  
 donepezil 5 mg tablet Commonly known as:  ARICEPT Take 1 Tab by mouth nightly. Indications: memory  
  
 ezetimibe 10 mg tablet Commonly known as:  Levorn Block TAKE 1 TABLET BY MOUTH DAILY FOR CHOLESTROL AND HEART  
  
 ferrous sulfate 325 mg (65 mg iron) tablet Commonly known as:  Iron TAKE ONE TABLET BY MOUTH TWICE DAILY WITH MEALS  
  
 glucose blood VI test strips strip Commonly known as:  ASCENSIA AUTODISC VI, ONE TOUCH ULTRA TEST VI  
TRUE MATRIX. DX R73.02, use to check sugar monthly and as needed for symptoms of low or high sugar LUMIGAN 0.03 % ophthalmic drops Generic drug:  bimatoprost  
Administer 1 Drop to both eyes every evening.  
  
 multivitamin tablet Commonly known as:  ONE A DAY Take 1 Tab by mouth daily. omeprazole 40 mg capsule Commonly known as:  PRILOSEC  
TAKE 1 CAPSULE BY MOUTH DAILY  
  
 timolol maleate 0.5 % Drpd ophthalmic solution Administer 1 Drop to both eyes daily. Introducing [facility]! Ella Johnston introduces [employer] patient portal. Now you can access parts of your medical record, email your doctor's office, and request medication refills online. 1. In your internet browser, go to [email or web address] the Trend. [email or web address]/Send the Trend 2. Click on the First Time User? Click Here link in the Sign In box. You will see the New Member Sign Up page. 3. Enter your [employer] Access Code exactly as it appears below. You will not need to use this code after youve completed the sign-up process. If you do not sign up before the expiration date, you must request a new code. · [employer] Access Code: [email or web address] Expires: 11/25/2018 11:34 AM 
 
4. Enter the last four digits of your Social Security Number (xxxx) and Date of Birth (mm/dd/yyyy) as indicated and click Submit. You will be taken to the next sign-up page. 5. Create a Gonway ID. This will be your [employer] login ID and cannot be changed, so think of one that is secure and easy to remember. 6. Create a [employer]onway password. You can change your password at any time. 7. Enter your Password Reset Question and Answer. This can be used at a later time if you forget your password. 8. Enter your e-mail address. You will receive e-mail notification when new information is available in [address] 9. Click Sign Up. You can now view and download portions of your medical record. 10. Click the Download Summary menu link to download a portable copy of your medical information. If you have questions, please visit the Frequently Asked Questions section of the [employer] website. Remember, [employer] is NOT to be used for urgent needs. For medical emergencies, dial 911. Now available from your iPhone and Android! Please provide this summary of care documentation to your next provider. Your primary care clinician is listed as Loretta Sandoval. If you have any questions after today's visit, please call [phone].

## 2018-08-27 NOTE — PROGRESS NOTES
1. Have you been to the ER, urgent care clinic since your last visit? Hospitalized since your last visit? No    2. Have you seen or consulted any other health care providers outside of the [address] [place] [address] since your last visit? Include any pap smears or colon screening.  no

## 2018-08-27 NOTE — ACP (ADVANCE CARE PLANNING)
Has advanced medical directive scanned into chart. Reviewed at this visit. No changes.   Miranda Nelson NP-C

## 2018-08-27 NOTE — PROGRESS NOTES
Subjective: Najma Nurse is a 80 y.o. female who presents today with her care giver with the following:  Chief Complaint   Patient presents with    Hypertension       Patient Active Problem List   Diagnosis Code    Hypertension I10    Hypercholesterolemia E78.00    Chronic kidney disease N18.9    Aortic stenosis I35.0    Anemia F99.7    Helicobacter pylori gastritis (chronic gastritis), treated K29.50, B96.81    Esophagitis, reflux K21.0    Hiatal hernia K44.9    Glucose intolerance (impaired glucose tolerance) R73.02    Stroke (HCC) A62.9    Diastolic CHF, chronic (HCC) I50.32         COMPLIANT WITH MEDICATION:   HTN; Denies chest pain, dyspnea, palpitations, headache and blurred vision. Blood pressure near normotensive. Taken off Benicar HCTZ after H/O falls in April . Care taker states that BP is mostly in the 829'Z systolic and 39'X diastolic. [address] daughter lives with Ms. Primitivo Fields  takes care of her medication. ROS:  Gen: denies fever, chills, fatigue, weight loss, weight gain  HEENT:denies blurry vision, nasal congestion, sore throat  Resp: denies dypsnea, cough, wheezing  CV: denies chest pain radiating to the jaws or arms, palpitations, lower extremity edema  Abd: denies nausea, vomiting, diarrhea, constipation  Neuro: denies numbness/tingling  Endo: denies polyuria, polydipsia, heat/cold intolerance  Heme: no lymphadenopathy    Allergies   Allergen Reactions    Pcn [Penicillins] Unknown (comments)         Current Outpatient Prescriptions:     donepezil (ARICEPT) 5 mg tablet, Take 1 Tab by mouth nightly.  Indications: memory, Disp: 90 Tab, Rfl: 3    cilostazol (PLETAL) 50 mg tablet, TAKE 1 TABLET BY MOUTH DAILY BEFORE BREAKFAST AND DINNER  Indications: cramps, Disp: 180 Tab, Rfl: 3    omeprazole (PRILOSEC) 40 mg capsule, TAKE 1 CAPSULE BY MOUTH DAILY, Disp: 90 Cap, Rfl: 3    ezetimibe (ZETIA) 10 mg tablet, TAKE 1 TABLET BY MOUTH DAILY FOR CHOLESTROL AND HEART, Disp: 90 Tab, Rfl: 3    ferrous sulfate (IRON) 325 mg (65 mg iron) tablet, TAKE ONE TABLET BY MOUTH TWICE DAILY WITH MEALS, Disp: 180 Tab, Rfl: 3    glucose blood VI test strips (ASCENSIA AUTODISC VI, ONE TOUCH ULTRA TEST VI) strip, TRUE MATRIX. DX R73.02, use to check sugar monthly and as needed for symptoms of low or high sugar, Disp: 50 Strip, Rfl: 11    multivitamin (ONE A DAY) tablet, Take 1 Tab by mouth daily. , Disp: , Rfl:     timolol maleate 0.5 % DrpD ophthalmic solution, Administer 1 Drop to both eyes daily. , Disp: , Rfl:     bimatoprost (LUMIGAN) 0.03 % ophthalmic drops, Administer 1 Drop to both eyes every evening., Disp: , Rfl:     aspirin delayed-release 81 mg tablet, Take  by mouth daily. , Disp: , Rfl:     Past Medical History:   Diagnosis Date    Aortic stenosis     Echo 4/15 mod stenosis . 9-1.0 cm2    Chronic kidney disease     Glucose intolerance (impaired glucose tolerance)     Hypercholesterolemia     Hypertension     Stroke ([facility] [address] 2010    sl left sided weakness       Past Surgical History:   Procedure Laterality Date    HX APPENDECTOMY      HX COLONOSCOPY  4.2014    HX ENDOSCOPY  4.2014    active gastritis, loaded w/ helicobacter    HX GYN      C SECTION 2       History   Smoking Status    Former Smoker    Types: Cigarettes    Quit date: 7/20/2014   Smokeless Tobacco    Never Used       Social History     Social History    Marital status:      Spouse name: N/A    Number of children: N/A    Years of education: N/A     Social History Main Topics    Smoking status: Former Smoker     Types: Cigarettes     Quit date: 7/20/2014    Smokeless tobacco: Never Used    Alcohol use No    Drug use: None    Sexual activity: Not Asked     Other Topics Concern    None     Social History Narrative       No family history on file.       Objective:     Visit Vitals    /70 (BP 1 Location: Left arm, BP Patient Position: Sitting)    Pulse 63    Temp 96.1 °F (35.6 °C) (Temporal)    Resp 20    Ht 5' 3\" (1.6 m)    Wt 148 lb (67.1 kg)    SpO2 95%    BMI 26.22 kg/m2     Body mass index is 26.22 kg/(m^2). General: Alert and oriented. No acute distress. Well nourished. Ambulates with walker. HEENT :  Ears:TMs are normal. Canals are clear. Eyes: pupils equal, round, react to light and accommodation. Extra ocular movements intact. Nose: patent. Mouth and throat is clear. Neck:supple full range of motion no thyromegaly. Trachea midline, No carotid bruits. No significant lymphadenopathy  Lungs[de-identified] clear to auscultation without wheezes, rales, or rhonchi. Heart :RRR, S1 & S2 are normal intensity. No murmur; no gallop  Abdomen: bowel sounds active. No tenderness, guarding, rebound, masses, hepatic or spleen enlargement  Back: no CVA tenderness. Extremities: without clubbing, cyanosis, or edema, right arm   Pulses: radial and femoral pulses are normal  Neuro: HMF intact. Cranial nerves II through XII grossly normal.  Motor: is 4 over 5 right side hemiparesis.    Deep tendon reflexes: +2 equal    Results for orders placed or performed in visit on 41/09/78   METABOLIC PANEL, BASIC   Result Value Ref Range    Glucose 109 (H) 65 - 99 mg/dL    BUN 30 (H) 8 - 27 mg/dL    Creatinine 1.58 (H) 0.57 - 1.00 mg/dL    GFR est non-AA 29 (L) >59 mL/min/1.73    GFR est AA 34 (L) >59 mL/min/1.73    BUN/Creatinine ratio 19 12 - 28    Sodium 145 (H) 134 - 144 mmol/L    Potassium 5.1 3.5 - 5.2 mmol/L    Chloride 107 (H) 96 - 106 mmol/L    CO2 23 18 - 29 mmol/L    Calcium 10.1 8.7 - 10.3 mg/dL   LIPID PANEL   Result Value Ref Range    Cholesterol, total 205 (H) 100 - 199 mg/dL    Triglyceride 85 0 - 149 mg/dL    HDL Cholesterol 72 >39 mg/dL    VLDL, calculated 17 5 - 40 mg/dL    LDL, calculated 116 (H) 0 - 99 mg/dL   HEMOGLOBIN A1C WITH EAG   Result Value Ref Range    Hemoglobin A1c 5.3 4.8 - 5.6 %    Estimated average glucose 105 mg/dL   CKD REPORT   Result Value Ref Range    Interpretation Note        No results found for this visit on 08/27/18. Assessment/ Plan:     1. BMI 26.0-26.9,adult  Discussed the patient's BMI with her. The BMI follow up plan is as follows:     dietary management education, guidance, and counseling  encourage exercise  monitor weight  prescribed dietary intake    An After Visit Summary was printed and given to the patient. HTN: BP stable for now consider Benicar without HCTZ if BP readings start to climb      No orders of the defined types were placed in this encounter. Verbal and written instructions (see AVS) provided.  Patient expresses understanding of diagnosis and treatment plan. Health Maintenance Due   Topic Date Due    ZOSTER VACCINE AGE 60>  09/14/1989    Bone Densitometry (Dexa) Screening  11/14/1994    Pneumococcal 65+ Low/Medium Risk (2 of 2 - PCV13) 10/07/2014    EYE EXAM RETINAL OR DILATED Q1  03/21/2015    GLAUCOMA SCREENING Q2Y  03/21/2016    HEMOGLOBIN A1C Q6M  03/19/2018    Influenza Age 5 to Adult  08/01/2018    MICROALBUMIN Q1  09/19/2018    LIPID PANEL Q1  09/19/2018         Follow-up Disposition:  Return in about 3 months (around 11/27/2018). or sooner.       FABIO Escobedo

## 2018-08-28 NOTE — TELEPHONE ENCOUNTER
Please send in an order for the new [employer] Scanner that you discussed yesterday. Medicare will pay for the scanner where you do not have to prick your finger. Please send in to [facility].

## 2018-08-29 NOTE — TELEPHONE ENCOUNTER
Pharmacy sent over prior authorization for meter. The [employer] meter will not be covered under patient's medical plan even with prior authorization. Patient advised.

## 2018-10-09 PROBLEM — S46.212A: Status: ACTIVE | Noted: 2018-10-09

## 2018-10-31 ENCOUNTER — OFFICE VISIT (OUTPATIENT)
Dept: CARDIOLOGY CLINIC | Age: 83
End: 2018-10-31

## 2018-10-31 VITALS
OXYGEN SATURATION: 98 % | SYSTOLIC BLOOD PRESSURE: 150 MMHG | WEIGHT: 149 LBS | HEART RATE: 74 BPM | HEIGHT: 61 IN | DIASTOLIC BLOOD PRESSURE: 62 MMHG | RESPIRATION RATE: 16 BRPM | BODY MASS INDEX: 28.13 KG/M2

## 2018-10-31 DIAGNOSIS — N18.30 STAGE 3 CHRONIC KIDNEY DISEASE (HCC): ICD-10-CM

## 2018-10-31 DIAGNOSIS — I10 ESSENTIAL HYPERTENSION: ICD-10-CM

## 2018-10-31 DIAGNOSIS — I50.32 CHRONIC DIASTOLIC CONGESTIVE HEART FAILURE (HCC): ICD-10-CM

## 2018-10-31 DIAGNOSIS — I95.9 HYPOTENSION, UNSPECIFIED HYPOTENSION TYPE: ICD-10-CM

## 2018-10-31 DIAGNOSIS — E78.00 HYPERCHOLESTEROLEMIA: ICD-10-CM

## 2018-10-31 DIAGNOSIS — E11.8 CONTROLLED TYPE 2 DIABETES MELLITUS WITH COMPLICATION, WITHOUT LONG-TERM CURRENT USE OF INSULIN (HCC): ICD-10-CM

## 2018-10-31 DIAGNOSIS — I35.0 NONRHEUMATIC AORTIC VALVE STENOSIS: Primary | ICD-10-CM

## 2018-10-31 NOTE — PROGRESS NOTES
Verified patient with two patient identifiers. Medications reviewed/approved by Dr. Imer Madrid. A verbal from Dr. Imer Madrid was given to remove any medications that were deleted during the visit. Medication(s) removed:none    Chief Complaint   Patient presents with    Hypotension     Per Mauri Dobbs     1. Have you been to the ER, urgent care clinic since your last visit? Hospitalized since your last visit? Yes, [facility] er 3/2017 for   Back pain  2. Have you seen or consulted any other health care providers outside of the [address] [place] [address] since your last visit? Include any pap smears or colon screening. no

## 2018-10-31 NOTE — PROGRESS NOTES
Wanda Bynum is a 80 y.o. female is here for cardiology f/u, last seen by me 1/17. Hx aortic stenosis (mod, AV mean 33 2016), hypertension/hypertensive CVD, diastolic CHF, s/p CVA, PAD, DM II, CKD, GERD, dyslipidemia, anemia. Recent issues with low BP, seen by PCP. Avapro held, now restarted. BP ok today. Some fatigue with sleepiness, mild OLSON. No recent labs. Seen by Dr Dustin Conte (Nephrology) in July with BUN 23, Cr 1.44. The patient denies chest pain, orthopnea, PND, LE edema, palpitations, syncope. Patient Active Problem List    Diagnosis Date Noted    Biceps muscle strain, left, initial encounter 44/26/4011    Diastolic CHF, chronic ([address] 07/20/2016    [facility]     Glucose intolerance (impaired glucose tolerance)     Helicobacter pylori gastritis (chronic gastritis), treated 05/07/2014    Esophagitis, reflux 05/07/2014    Hiatal hernia 05/07/2014    Anemia 04/09/2014    Hypertension     Hypercholesterolemia     Chronic kidney disease     Aortic stenosis       Dilma Bernard NP  Past Medical History:   Diagnosis Date    Aortic stenosis     Echo 4/15 mod stenosis . 9-1.0 cm2    Chronic kidney disease     Diabetes mellitus ([address]     Glucose intolerance (impaired glucose tolerance)     Hypercholesterolemia     Hypertension     Stroke ([address] 2010    sl left sided weakness      Past Surgical History:   Procedure Laterality Date    HX APPENDECTOMY      HX COLONOSCOPY  4.2014    HX ENDOSCOPY  4.2014    active gastritis, loaded w/ helicobacter    HX GYN      C SECTION 2     Allergies   Allergen Reactions    Pcn [Penicillins] Unknown (comments)      History reviewed. No pertinent family history.    Social History     Socioeconomic History    Marital status:      Spouse name: Not on file    Number of children: Not on file    Years of education: Not on file    Highest education level: Not on file   Social Needs    Financial resource strain: Not on file   Hallie-Jennie insecurity - worry: Not on file    Food insecurity - inability: Not on file    Transportation needs - medical: Not on file   [employer] needs - non-medical: Not on file   Occupational History    Not on file   Tobacco Use    Smoking status: Former Smoker     Types: Cigarettes     Last attempt to quit: 2014     Years since quittin.2    Smokeless tobacco: Never Used   Substance and Sexual Activity    Alcohol use: Yes     Alcohol/week: 0.0 oz     Comment: Very Rarely.  Drug use: Not on file    Sexual activity: Not on file   Other Topics Concern     Service No    Blood Transfusions No    Caffeine Concern No    Occupational Exposure No    Hobby Hazards No    Sleep Concern No    Stress Concern No    Weight Concern No    Special Diet Yes     Comment: Diabetic    Back Care Not Asked    Exercise No    Bike Helmet Not Asked    Seat Belt Yes    Self-Exams Yes   Social History Narrative    Not on file      Current Outpatient Medications   Medication Sig    lidocaine (LIDODERM) 5 % 1 Patch by TransDERmal route every twenty-four (24) hours. Apply patch to the affected area for 12 hours a day, then remove    lidocaine (XYLOCAINE) 4 % topical cream Apply  to affected area two (2) times daily as needed for Pain.  glucose blood VI test strips (TRUE METRIX GLUCOSE TEST STRIP) strip Dx R73.02  Use to check blood sugar monthly and prn.  irbesartan-hydroCHLOROthiazide (AVALIDE) 150-12.5 mg per tablet Take 1 Tab by mouth daily. Indications: pressure and heart, replaces benicar    flash glucose scanning reader (FREESTYLE LITZY READER) misc Dx R73.02    flash glucose sensor (FREESTYLE LITZY SENSOR) kit Dx R73.02    donepezil (ARICEPT) 5 mg tablet Take 1 Tab by mouth nightly.  Indications: memory    cilostazol (PLETAL) 50 mg tablet TAKE 1 TABLET BY MOUTH DAILY BEFORE BREAKFAST AND DINNER  Indications: cramps    omeprazole (PRILOSEC) 40 mg capsule TAKE 1 CAPSULE BY MOUTH DAILY    ezetimibe (ZETIA) 10 mg tablet TAKE 1 TABLET BY MOUTH DAILY FOR CHOLESTROL AND HEART    ferrous sulfate (IRON) 325 mg (65 mg iron) tablet TAKE ONE TABLET BY MOUTH TWICE DAILY WITH MEALS    multivitamin (ONE A DAY) tablet Take 1 Tab by mouth daily.  timolol maleate 0.5 % DrpD ophthalmic solution Administer 1 Drop to both eyes daily.  bimatoprost (LUMIGAN) 0.03 % ophthalmic drops Administer 1 Drop to both eyes every evening.  aspirin delayed-release 81 mg tablet Take  by mouth daily. No current facility-administered medications for this visit. Review of Symptoms:    CONST  No weight change. No fever, chills, sweats    ENT No visual changes, URI sx, sore throat    CV  See HPI   RESP  No cough, or sputum, wheezing. Also see HPI   GI  No abdominal pain or change in bowel habits. No heartburn or dysphagia. No melena or rectal bleeding.   No dysuria, urgency, frequency, hematuria   MSKEL  No joint pain, swelling. No muscle pain. SKIN  No rash or lesions. NEURO  No headache, syncope, or seizure. No weakness, loss of sensation, or paresthesias. PSYCH  No low mood or depression  No anxiety. HE/LYMPH  No easy bruising, abnormal bleeding, or enlarged glands.         Physical ExamPhysical Exam:    Visit Vitals  Ht 5' 0.5\" (1.537 m)   BMI 28.27 kg/m²     Gen: NAD  HEENT:  PERRL, throat clear  Neck: no adenopathy, no thyromegaly, no JVD   Heart:  Regular,Nl S1S2,  III/VI aortic  murmur, no gallop or rub.   Lungs:  clear  Abdomen:   Soft, non-tender, bowel sounds are active.   Extremities:  No edema  Pulse: symmetric  Neuro: A&O times 3, No focal neuro deficits    Cardiographics    ECG: NSR, LVH, no acute changes    Labs:   Lab Results   Component Value Date/Time    Sodium 145 (H) 09/19/2017 11:43 AM    Sodium 141 03/14/2017 01:30 PM    Sodium 144 10/27/2016 11:36 AM    Sodium 141 07/28/2016 10:17 AM    Sodium 141 05/03/2016 11:20 AM    Potassium 5.1 09/19/2017 11:43 AM    Potassium 3.9 03/14/2017 01:30 PM    Potassium 5.2 10/27/2016 11:36 AM    Potassium 5.0 07/28/2016 10:17 AM    Potassium 4.6 05/03/2016 11:20 AM    Chloride 107 (H) 09/19/2017 11:43 AM    Chloride 105 03/14/2017 01:30 PM    Chloride 106 10/27/2016 11:36 AM    Chloride 99 07/28/2016 10:17 AM    Chloride 101 05/03/2016 11:20 AM    CO2 23 09/19/2017 11:43 AM    CO2 28 03/14/2017 01:30 PM    CO2 25 10/27/2016 11:36 AM    CO2 23 07/28/2016 10:17 AM    CO2 25 05/03/2016 11:20 AM    Anion gap 8 03/14/2017 01:30 PM    Glucose 109 (H) 09/19/2017 11:43 AM    Glucose 114 (H) 03/14/2017 01:30 PM    Glucose 118 (H) 10/27/2016 11:36 AM    Glucose 158 (H) 07/28/2016 10:17 AM    Glucose 187 (H) 05/03/2016 11:20 AM    BUN 30 (H) 09/19/2017 11:43 AM    BUN 24 (H) 03/14/2017 01:30 PM    BUN 46 (H) 10/27/2016 11:36 AM    BUN 41 (H) 07/28/2016 10:17 AM    BUN 34 (H) 05/03/2016 11:20 AM    Creatinine 1.58 (H) 09/19/2017 11:43 AM    Creatinine 1.67 (H) 03/14/2017 01:30 PM    Creatinine 2.39 (H) 10/27/2016 11:36 AM    Creatinine 2.09 (H) 07/28/2016 10:17 AM    Creatinine 2.26 (H) 05/03/2016 11:20 AM    BUN/Creatinine ratio 19 09/19/2017 11:43 AM    BUN/Creatinine ratio 14 03/14/2017 01:30 PM    BUN/Creatinine ratio 19 10/27/2016 11:36 AM    BUN/Creatinine ratio 20 07/28/2016 10:17 AM    BUN/Creatinine ratio 15 05/03/2016 11:20 AM    GFR est AA 34 (L) 09/19/2017 11:43 AM    GFR est AA 35 (L) 03/14/2017 01:30 PM    GFR est AA 21 (L) 10/27/2016 11:36 AM    GFR est AA 24 (L) 07/28/2016 10:17 AM    GFR est AA 22 (L) 05/03/2016 11:20 AM    GFR est non-AA 29 (L) 09/19/2017 11:43 AM    GFR est non-AA 29 (L) 03/14/2017 01:30 PM    GFR est non-AA 18 (L) 10/27/2016 11:36 AM    GFR est non-AA 21 (L) 07/28/2016 10:17 AM    GFR est non-AA 19 (L) 05/03/2016 11:20 AM    Calcium 10.1 09/19/2017 11:43 AM    Calcium 9.9 03/14/2017 01:30 PM    Calcium 9.9 10/27/2016 11:36 AM    Calcium 10.2 07/28/2016 10:17 AM    Calcium 9.6 05/03/2016 11:20 AM    Bilirubin, total 0.6 03/14/2017 01:30 PM    Bilirubin, total 0.3 10/27/2016 11:36 AM    Bilirubin, total 0.3 07/28/2016 10:17 AM    Bilirubin, total 0.4 04/07/2016 03:43 PM    AST (SGOT) 16 03/14/2017 01:30 PM    AST (SGOT) 15 10/27/2016 11:36 AM    AST (SGOT) 12 07/28/2016 10:17 AM    AST (SGOT) 13 04/07/2016 03:43 PM    AST (SGOT) 21 03/24/2015 02:45 PM    Alk. phosphatase 75 03/14/2017 01:30 PM    Alk. phosphatase 62 10/27/2016 11:36 AM    Alk. phosphatase 54 07/28/2016 10:17 AM    Alk.  phosphatase 65 04/07/2016 03:43 PM    Protein, total 7.3 03/14/2017 01:30 PM    Protein, total 6.9 10/27/2016 11:36 AM    Protein, total 6.8 07/28/2016 10:17 AM    Protein, total 6.8 04/07/2016 03:43 PM    Albumin 3.5 03/14/2017 01:30 PM    Albumin 4.0 10/27/2016 11:36 AM    Albumin 4.1 07/28/2016 10:17 AM    Albumin 4.2 04/07/2016 03:43 PM    Albumin 4.0 09/23/2014 10:17 AM    Globulin 3.8 03/14/2017 01:30 PM    A-G Ratio 0.9 (L) 03/14/2017 01:30 PM    A-G Ratio 1.4 10/27/2016 11:36 AM    A-G Ratio 1.5 07/28/2016 10:17 AM    A-G Ratio 1.6 04/07/2016 03:43 PM    ALT (SGPT) 11 (L) 03/14/2017 01:30 PM    ALT (SGPT) 10 10/27/2016 11:36 AM    ALT (SGPT) 7 07/28/2016 10:17 AM    ALT (SGPT) 9 04/07/2016 03:43 PM    ALT (SGPT) 7 09/23/2014 10:17 AM     No results found for: CPK, CPKX, CPX  Lab Results   Component Value Date/Time    Cholesterol, total 205 (H) 09/19/2017 11:43 AM    Cholesterol, total 168 10/27/2016 11:36 AM    Cholesterol, total 157 03/24/2015 02:45 PM    Cholesterol, total 187 09/23/2014 10:17 AM    Cholesterol, total 138 10/07/2013 09:47 AM    HDL Cholesterol 72 09/19/2017 11:43 AM    HDL Cholesterol 55 10/27/2016 11:36 AM    HDL Cholesterol 49 03/24/2015 02:45 PM    HDL Cholesterol 74 09/23/2014 10:17 AM    HDL Cholesterol 52 10/07/2013 09:47 AM    LDL, calculated 116 (H) 09/19/2017 11:43 AM    LDL, calculated 88 10/27/2016 11:36 AM    LDL, calculated 84 03/24/2015 02:45 PM    LDL, calculated 93 09/23/2014 10:17 AM    LDL, calculated 69 10/07/2013 09:47 AM    Triglyceride 85 09/19/2017 11:43 AM    Triglyceride 125 10/27/2016 11:36 AM    Triglyceride 122 03/24/2015 02:45 PM    Triglyceride 101 09/23/2014 10:17 AM    Triglyceride 83 10/07/2013 09:47 AM     No results found for this or any previous visit. Assessment:         Patient Active Problem List    Diagnosis Date Noted    Biceps muscle strain, left, initial encounter 89/12/4815    Diastolic CHF, chronic ([facility] 07/20/2016    Stroke ([facility] [address]     Glucose intolerance (impaired glucose tolerance)     Helicobacter pylori gastritis (chronic gastritis), treated 05/07/2014    Esophagitis, reflux 05/07/2014    Hiatal hernia 05/07/2014    Anemia 04/09/2014    Hypertension     Hypercholesterolemia     Chronic kidney disease     Aortic stenosis      80 y.o. female is here for cardiology f/u, last seen by me 1/17. Hx aortic stenosis (mod, AV mean 33 2016), hypertension/hypertensive CVD, diastolic CHF, s/p CVA, PAD, DM II, CKD, GERD, dyslipidemia, anemia. Recent issues with low BP, seen by PCP. Avapro held, now restarted. BP ok today. Some fatigue with sleepiness, mild OLSON. No recent labs. Seen by Dr Magi Smith (Nephrology) in July with BUN 23, Cr 1.44. Plan:     Echo/doppler--recheck aortic stenosis (suspect progressed, affecting labile BP and more sensitive to hydration status)--call w/ results  Continue current meds   Push fluids  F/u with PCP and Nephrology as planned  RTC  in 6 months.     Daly Calhoun MD
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
## 2018-12-28 PROBLEM — R60.0 EDEMA OF LEFT LOWER EXTREMITY: Status: ACTIVE | Noted: 2018-12-28
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
## 2019-06-17 ENCOUNTER — OFFICE VISIT (OUTPATIENT)
Dept: CARDIOLOGY CLINIC | Age: 84
End: 2019-06-17

## 2019-06-17 VITALS
SYSTOLIC BLOOD PRESSURE: 154 MMHG | DIASTOLIC BLOOD PRESSURE: 64 MMHG | BODY MASS INDEX: 26.4 KG/M2 | OXYGEN SATURATION: 100 % | HEART RATE: 70 BPM | HEIGHT: 63 IN | WEIGHT: 149 LBS | RESPIRATION RATE: 14 BRPM

## 2019-06-17 DIAGNOSIS — I35.0 NONRHEUMATIC AORTIC VALVE STENOSIS: ICD-10-CM

## 2019-06-17 DIAGNOSIS — I10 ESSENTIAL HYPERTENSION: ICD-10-CM

## 2019-06-17 DIAGNOSIS — E11.8 CONTROLLED TYPE 2 DIABETES MELLITUS WITH COMPLICATION, WITHOUT LONG-TERM CURRENT USE OF INSULIN (HCC): ICD-10-CM

## 2019-06-17 DIAGNOSIS — I50.32 DIASTOLIC CHF, CHRONIC (HCC): Primary | ICD-10-CM

## 2019-06-17 DIAGNOSIS — E78.00 HYPERCHOLESTEROLEMIA: ICD-10-CM

## 2019-06-17 DIAGNOSIS — N18.30 STAGE 3 CHRONIC KIDNEY DISEASE (HCC): ICD-10-CM

## 2019-06-17 DIAGNOSIS — R60.0 EDEMA OF LEFT LOWER EXTREMITY: ICD-10-CM

## 2019-06-17 DIAGNOSIS — I63.9 CEREBROVASCULAR ACCIDENT (CVA), UNSPECIFIED MECHANISM (HCC): ICD-10-CM

## 2019-06-17 PROBLEM — S46.212A: Status: RESOLVED | Noted: 2018-10-09 | Resolved: 2019-06-17

## 2019-06-17 NOTE — PROGRESS NOTES
Verified patient with two patient identifiers. Medications reviewed/approved by Dr. José Miguel Sheffield. A verbal from Dr. José Miguel Sheffield was given to remove any medications that were deleted during the visit. Medication(s) removed:  nitrofurantoin, macrocrystal-monohydrate, (MACROBID) 100 mg capsule       Chief Complaint   Patient presents with    Aortic Stenosis     8 month follow up    CHF    Hypertension    Leg Swelling    Cerebrovascular Accident     1. Have you been to the ER, urgent care clinic since your last visit? Hospitalized since your last visit?  no  2. Have you seen or consulted any other health care providers outside of the [address] [place] [address] since your last visit? Include any pap smears or colon screening.  no

## 2019-06-17 NOTE — PROGRESS NOTES
Luna Myers is a 80 y.o. female is here for routine f/u. Hx aortic stenosis (mod, AV mean 33 in  2016), hypertension/hypertensive CVD, diastolic CHF, s/p CVA, PAD, DM II, CKD, GERD, dyslipidemia, anemia. I saw her in 10/18, last seen previously in 2017--concerned about progressive aortic stenosis and repeat Echo ordered but NOT done. Seen by PCP 5/29/19 with labs, etc. Hb 8.9, BUN/Cr 27/1.71,K 4/7. Sees Dr. Riaz Gage for Nephrology. Mild stable OLSON. The patient denies chest pain, orthopnea, PND, LE edema, palpitations, syncope, presyncope or fatigue. Patient Active Problem List    Diagnosis Date Noted    Edema of left lower extremity 12/28/2018    Fall at home 15/76/4989    Diastolic CHF, chronic ([address] 07/20/2016    Stroke ([address]     Glucose intolerance (impaired glucose tolerance)     Esophagitis, reflux 05/07/2014    Hiatal hernia 05/07/2014    Anemia 04/09/2014    Hypertension     Hypercholesterolemia     Chronic kidney disease     Aortic stenosis       Salvador Choudhary NP  Past Medical History:   Diagnosis Date    Aortic stenosis     Echo 4/15 mod stenosis . 9-1.0 cm2    Chronic kidney disease     Diabetes mellitus ([address]     Glucose intolerance (impaired glucose tolerance)     Hypercholesterolemia     Hypertension     Stroke ([address] 2010    sl left sided weakness      Past Surgical History:   Procedure Laterality Date    HX APPENDECTOMY      HX COLONOSCOPY  4.2014    HX ENDOSCOPY  4.2014    active gastritis, loaded w/ helicobacter    HX GYN      C SECTION 2     Allergies   Allergen Reactions    Pcn [Penicillins] Unknown (comments)      History reviewed. No pertinent family history.    Social History     Socioeconomic History    Marital status:      Spouse name: Not on file    Number of children: Not on file    Years of education: Not on file    Highest education level: Not on file   Occupational History    Not on file   Social Needs    Financial resource strain: Not on file    Food insecurity:     Worry: Not on file     Inability: Not on file    Transportation needs:     Medical: Not on file     Non-medical: Not on file   Tobacco Use    Smoking status: Former Smoker     Types: Cigarettes     Last attempt to quit: 2014     Years since quittin.9    Smokeless tobacco: Never Used   Substance and Sexual Activity    Alcohol use: Yes     Alcohol/week: 0.0 oz     Comment: Very Rarely.  Drug use: Not on file    Sexual activity: Not on file   Lifestyle    Physical activity:     Days per week: Not on file     Minutes per session: Not on file    Stress: Not on file   Relationships    Social connections:     Talks on phone: Not on file     Gets together: Not on file     Attends Advent service: Not on file     Active member of club or organization: Not on file     Attends meetings of clubs or organizations: Not on file     Relationship status: Not on file    Intimate partner violence:     Fear of current or ex partner: Not on file     Emotionally abused: Not on file     Physically abused: Not on file     Forced sexual activity: Not on file   Other Topics Concern     Service No    Blood Transfusions No    Caffeine Concern No    Occupational Exposure No    Hobby Hazards No    Sleep Concern No    Stress Concern No    Weight Concern No    Special Diet Yes     Comment: Diabetic    Back Care Not Asked    Exercise No    Bike Helmet Not Asked    Seat Belt Yes    Self-Exams Yes   Social History Narrative    Not on file      Current Outpatient Medications   Medication Sig    losartan (COZAAR) 50 mg tablet Take 1 Tab by mouth daily.  hydroCHLOROthiazide (HYDRODIURIL) 12.5 mg tablet Take 1 Tab by mouth daily.  acetaminophen-codeine (TYLENOL-CODEINE #3) 300-30 mg per tablet Take 1 Tab by mouth every six (6) hours as needed for Pain. Max Daily Amount: 4 Tabs.     glucose blood VI test strips (TRUE METRIX GLUCOSE TEST STRIP) strip Dx R73.02  Use to check blood sugar monthly and prn.  flash glucose scanning reader (FREESTYLE LITZY READER) misc Dx R73.02    flash glucose sensor (FREESTYLE LITZY SENSOR) kit Dx R73.02    donepezil (ARICEPT) 5 mg tablet Take 1 Tab by mouth nightly. Indications: memory    cilostazol (PLETAL) 50 mg tablet TAKE 1 TABLET BY MOUTH DAILY BEFORE BREAKFAST AND DINNER  Indications: cramps    omeprazole (PRILOSEC) 40 mg capsule TAKE 1 CAPSULE BY MOUTH DAILY    ezetimibe (ZETIA) 10 mg tablet TAKE 1 TABLET BY MOUTH DAILY FOR CHOLESTROL AND HEART    ferrous sulfate (IRON) 325 mg (65 mg iron) tablet TAKE ONE TABLET BY MOUTH TWICE DAILY WITH MEALS    multivitamin (ONE A DAY) tablet Take 1 Tab by mouth daily.  timolol maleate 0.5 % DrpD ophthalmic solution Administer 1 Drop to both eyes daily.  bimatoprost (LUMIGAN) 0.03 % ophthalmic drops Administer 1 Drop to both eyes every evening.  aspirin delayed-release 81 mg tablet Take  by mouth daily.  lidocaine (LIDODERM) 5 % 1 Patch by TransDERmal route every twenty-four (24) hours. Apply patch to the affected area for 12 hours a day, then remove    lidocaine (XYLOCAINE) 4 % topical cream Apply  to affected area two (2) times daily as needed for Pain. No current facility-administered medications for this visit. Review of Symptoms:    CONST  No weight change. No fever, chills, sweats    ENT No visual changes, URI sx, sore throat    CV  See HPI   RESP  No cough, or sputum, wheezing. Also see HPI   GI  No abdominal pain or change in bowel habits. No heartburn or dysphagia. No melena or rectal bleeding.   No dysuria, urgency, frequency, hematuria   MSKEL  No joint pain, swelling. No muscle pain. SKIN  No rash or lesions. NEURO  No headache, syncope, or seizure. No weakness, loss of sensation, or paresthesias. PSYCH  No low mood or depression  No anxiety. HE/LYMPH  No easy bruising, abnormal bleeding, or enlarged glands.         Physical ExamPhysical Exam:    Visit Vitals  /64 (BP 1 Location: Left arm, BP Patient Position: Sitting)   Pulse 70   Resp 14   Ht 5' 3\" (1.6 m)   Wt 149 lb (67.6 kg)   SpO2 100% Comment: ra   BMI 26.39 kg/m²     Gen: NAD  HEENT:  PERRL, throat clear  Neck: no adenopathy, no thyromegaly, no JVD   Heart:  Regular,Nl S1S2, II/VI murmur, no gallop or rub.   Lungs:  clear  Abdomen:   Soft, non-tender, bowel sounds are active.   Extremities:  No edema  Pulse: symmetric  Neuro: A&O times 3, No focal neuro deficits    Cardiographics    ECG: NSR, LVH, no acute changes    Labs:   Lab Results   Component Value Date/Time    Sodium 143 05/29/2019 11:01 AM    Sodium 143 02/28/2019 12:14 PM    Sodium 148 (H) 11/30/2018 11:21 AM    Sodium 145 (H) 09/19/2017 11:43 AM    Sodium 141 03/14/2017 01:30 PM    Potassium 4.7 05/29/2019 11:01 AM    Potassium 5.6 (H) 02/28/2019 12:14 PM    Potassium 4.8 11/30/2018 11:21 AM    Potassium 5.1 09/19/2017 11:43 AM    Potassium 3.9 03/14/2017 01:30 PM    Chloride 106 05/29/2019 11:01 AM    Chloride 107 (H) 02/28/2019 12:14 PM    Chloride 111 (H) 11/30/2018 11:21 AM    Chloride 107 (H) 09/19/2017 11:43 AM    Chloride 105 03/14/2017 01:30 PM    CO2 21 05/29/2019 11:01 AM    CO2 22 02/28/2019 12:14 PM    CO2 24 11/30/2018 11:21 AM    CO2 23 09/19/2017 11:43 AM    CO2 28 03/14/2017 01:30 PM    Anion gap 8 03/14/2017 01:30 PM    Glucose 92 05/29/2019 11:01 AM    Glucose 93 02/28/2019 12:14 PM    Glucose 104 (H) 11/30/2018 11:21 AM    Glucose 109 (H) 09/19/2017 11:43 AM    Glucose 114 (H) 03/14/2017 01:30 PM    BUN 27 05/29/2019 11:01 AM    BUN 29 (H) 02/28/2019 12:14 PM    BUN 32 (H) 11/30/2018 11:21 AM    BUN 30 (H) 09/19/2017 11:43 AM    BUN 24 (H) 03/14/2017 01:30 PM    Creatinine 1.71 (H) 05/29/2019 11:01 AM    Creatinine 1.79 (H) 02/28/2019 12:14 PM    Creatinine 1.82 (H) 11/30/2018 11:21 AM    Creatinine 1.58 (H) 09/19/2017 11:43 AM    Creatinine 1.67 (H) 03/14/2017 01:30 PM    BUN/Creatinine ratio 16 05/29/2019 11:01 AM    BUN/Creatinine ratio 16 02/28/2019 12:14 PM    BUN/Creatinine ratio 18 11/30/2018 11:21 AM    BUN/Creatinine ratio 19 09/19/2017 11:43 AM    BUN/Creatinine ratio 14 03/14/2017 01:30 PM    GFR est AA 30 (L) 05/29/2019 11:01 AM    GFR est AA 29 (L) 02/28/2019 12:14 PM    GFR est AA 28 (L) 11/30/2018 11:21 AM    GFR est AA 34 (L) 09/19/2017 11:43 AM    GFR est AA 35 (L) 03/14/2017 01:30 PM    GFR est non-AA 26 (L) 05/29/2019 11:01 AM    GFR est non-AA 25 (L) 02/28/2019 12:14 PM    GFR est non-AA 24 (L) 11/30/2018 11:21 AM    GFR est non-AA 29 (L) 09/19/2017 11:43 AM    GFR est non-AA 29 (L) 03/14/2017 01:30 PM    Calcium 9.9 05/29/2019 11:01 AM    Calcium 9.8 02/28/2019 12:14 PM    Calcium 9.7 11/30/2018 11:21 AM    Calcium 10.1 09/19/2017 11:43 AM    Calcium 9.9 03/14/2017 01:30 PM    Bilirubin, total 0.2 05/29/2019 11:01 AM    Bilirubin, total <0.2 02/28/2019 12:14 PM    Bilirubin, total <0.2 11/30/2018 11:21 AM    Bilirubin, total 0.6 03/14/2017 01:30 PM    Bilirubin, total 0.3 10/27/2016 11:36 AM    AST (SGOT) 15 05/29/2019 11:01 AM    AST (SGOT) 14 02/28/2019 12:14 PM    AST (SGOT) 14 11/30/2018 11:21 AM    AST (SGOT) 16 03/14/2017 01:30 PM    AST (SGOT) 15 10/27/2016 11:36 AM    Alk. phosphatase 65 05/29/2019 11:01 AM    Alk. phosphatase 63 02/28/2019 12:14 PM    Alk. phosphatase 63 11/30/2018 11:21 AM    Alk. phosphatase 75 03/14/2017 01:30 PM    Alk.  phosphatase 62 10/27/2016 11:36 AM    Protein, total 6.9 05/29/2019 11:01 AM    Protein, total 6.8 02/28/2019 12:14 PM    Protein, total 6.6 11/30/2018 11:21 AM    Protein, total 7.3 03/14/2017 01:30 PM    Protein, total 6.9 10/27/2016 11:36 AM    Albumin 4.2 05/29/2019 11:01 AM    Albumin 3.9 02/28/2019 12:14 PM    Albumin 3.9 11/30/2018 11:21 AM    Albumin 3.5 03/14/2017 01:30 PM    Albumin 4.0 10/27/2016 11:36 AM    Globulin 3.8 03/14/2017 01:30 PM    A-G Ratio 1.6 05/29/2019 11:01 AM    A-G Ratio 1.3 02/28/2019 12:14 PM    A-G Ratio 1.4 11/30/2018 11:21 AM    A-G Ratio 0.9 (L) 03/14/2017 01:30 PM    A-G Ratio 1.4 10/27/2016 11:36 AM    ALT (SGPT) 7 05/29/2019 11:01 AM    ALT (SGPT) 8 02/28/2019 12:14 PM    ALT (SGPT) 10 11/30/2018 11:21 AM    ALT (SGPT) 11 (L) 03/14/2017 01:30 PM    ALT (SGPT) 10 10/27/2016 11:36 AM     No results found for: CPK, CPKX, CPX  Lab Results   Component Value Date/Time    Cholesterol, total 205 (H) 09/19/2017 11:43 AM    Cholesterol, total 168 10/27/2016 11:36 AM    Cholesterol, total 157 03/24/2015 02:45 PM    Cholesterol, total 187 09/23/2014 10:17 AM    Cholesterol, total 138 10/07/2013 09:47 AM    HDL Cholesterol 72 09/19/2017 11:43 AM    HDL Cholesterol 55 10/27/2016 11:36 AM    HDL Cholesterol 49 03/24/2015 02:45 PM    HDL Cholesterol 74 09/23/2014 10:17 AM    HDL Cholesterol 52 10/07/2013 09:47 AM    LDL, calculated 116 (H) 09/19/2017 11:43 AM    LDL, calculated 88 10/27/2016 11:36 AM    LDL, calculated 84 03/24/2015 02:45 PM    LDL, calculated 93 09/23/2014 10:17 AM    LDL, calculated 69 10/07/2013 09:47 AM    Triglyceride 85 09/19/2017 11:43 AM    Triglyceride 125 10/27/2016 11:36 AM    Triglyceride 122 03/24/2015 02:45 PM    Triglyceride 101 09/23/2014 10:17 AM    Triglyceride 83 10/07/2013 09:47 AM     No results found for this or any previous visit. Assessment:         Patient Active Problem List    Diagnosis Date Noted    Edema of left lower extremity 12/28/2018    Fall at home 50/59/0530    Diastolic CHF, chronic ([address] 07/20/2016    St[facility] [address]     Glucose intolerance (impaired glucose tolerance)     Esophagitis, reflux 05/07/2014    Hiatal hernia 05/07/2014    Anemia 04/09/2014    Hypertension     Hypercholesterolemia     Chronic kidney disease     Aortic stenosis        Hx aortic stenosis (mod, AV mean 33 in  2016), hypertension/hypertensive CVD, diastolic CHF, s/p CVA, PAD, DM II, CKD, GERD, dyslipidemia, anemia.  I saw her in 10/18, last seen previously in 2017--concerned about progressive aortic stenosis and repeat Echo ordered but NOT done. Seen by PCP 5/29/19 with labs, etc. Hb 8.9, BUN/Cr 27/1.71,K 4/7. Sees Dr. Milagros Deleon for Nephrology. Mild stable OLSON. Plan: Will reorder Echo/doppler (aortic stenosis--r/o progression, diastolic CHF)--call w/ results  Continue current meds/rx  BP running high today, but previously low and hx falls so will not adjust  F/u with PCP and Nephrology as planned  Continue current care and f/u in 6 months.     Robel Butt MD

## 2019-07-01 ENCOUNTER — TELEPHONE (OUTPATIENT)
Dept: CARDIOLOGY CLINIC | Age: 84
End: 2019-07-01

## 2019-07-01 NOTE — TELEPHONE ENCOUNTER
----- Message from Hao Nino MD sent at 6/28/2019  8:19 AM EDT -----  Regarding: echo  Advise echo shows normal LV pumping function, moderate aortic stenosis--unchanged from previous study.   Thanks [facility]

## 2019-07-06 DIAGNOSIS — G30.1 LATE ONSET ALZHEIMER'S DISEASE WITHOUT BEHAVIORAL DISTURBANCE (HCC): ICD-10-CM

## 2019-07-06 DIAGNOSIS — F02.80 LATE ONSET ALZHEIMER'S DISEASE WITHOUT BEHAVIORAL DISTURBANCE (HCC): ICD-10-CM

## 2019-07-06 DIAGNOSIS — E78.5 HYPERLIPIDEMIA, UNSPECIFIED HYPERLIPIDEMIA TYPE: ICD-10-CM

## 2019-07-08 ENCOUNTER — HOME HEALTH ADMISSION (OUTPATIENT)
Dept: HOME HEALTH SERVICES | Facility: HOME HEALTH | Age: 84
End: 2019-07-08
Payer: MEDICARE

## 2019-07-08 RX ORDER — DONEPEZIL HYDROCHLORIDE 5 MG/1
TABLET, FILM COATED ORAL
Qty: 90 TAB | Refills: 3 | Status: SHIPPED | OUTPATIENT
Start: 2019-07-08 | End: 2019-08-21 | Stop reason: SDUPTHER

## 2019-07-08 RX ORDER — EZETIMIBE 10 MG/1
TABLET ORAL
Qty: 90 TAB | Refills: 3 | Status: SHIPPED | OUTPATIENT
Start: 2019-07-08 | End: 2019-08-09 | Stop reason: SDUPTHER

## 2019-07-11 ENCOUNTER — HOME CARE VISIT (OUTPATIENT)
Dept: SCHEDULING | Facility: HOME HEALTH | Age: 84
End: 2019-07-11
Payer: MEDICARE

## 2019-07-11 ENCOUNTER — HOME CARE VISIT (OUTPATIENT)
Dept: HOME HEALTH SERVICES | Facility: HOME HEALTH | Age: 84
End: 2019-07-11
Payer: MEDICARE

## 2019-07-11 VITALS
TEMPERATURE: 98.7 F | OXYGEN SATURATION: 100 % | HEART RATE: 61 BPM | DIASTOLIC BLOOD PRESSURE: 60 MMHG | SYSTOLIC BLOOD PRESSURE: 145 MMHG

## 2019-07-11 PROCEDURE — G0152 HHCP-SERV OF OT,EA 15 MIN: HCPCS

## 2019-07-11 PROCEDURE — G0151 HHCP-SERV OF PT,EA 15 MIN: HCPCS

## 2019-07-11 PROCEDURE — 400013 HH SOC

## 2019-07-11 NOTE — Clinical Note
Jeanette Altman was admitted to home health PT services 7/11/19 due to frequent falls. Kamar Klein, I am pretty sure you see this pt but I am sending this also to Dr Milan Beckford since she is the signing physician for home health. May we please have orders for home health [facility]? Medication reconciliation completed. Pt does not have lidocaine patches in the home and states she is not using lidocaine cream. 
Pt does have muciprocin 2% gel which she is using on her R 5th toe per podiatry orders.

## 2019-07-12 VITALS
HEART RATE: 61 BPM | SYSTOLIC BLOOD PRESSURE: 145 MMHG | TEMPERATURE: 98.7 F | OXYGEN SATURATION: 100 % | DIASTOLIC BLOOD PRESSURE: 60 MMHG

## 2019-07-16 ENCOUNTER — HOME CARE VISIT (OUTPATIENT)
Dept: SCHEDULING | Facility: HOME HEALTH | Age: 84
End: 2019-07-16
Payer: MEDICARE

## 2019-07-16 VITALS
HEART RATE: 61 BPM | TEMPERATURE: 97.9 F | SYSTOLIC BLOOD PRESSURE: 104 MMHG | DIASTOLIC BLOOD PRESSURE: 51 MMHG | OXYGEN SATURATION: 98 %

## 2019-07-16 PROCEDURE — G0151 HHCP-SERV OF PT,EA 15 MIN: HCPCS

## 2019-07-16 NOTE — PROGRESS NOTES
OK for home health [facility].   If she does not need or use the lidocaine patches or cream, these can be discontinued from her list

## 2019-07-18 ENCOUNTER — HOME CARE VISIT (OUTPATIENT)
Dept: HOME HEALTH SERVICES | Facility: HOME HEALTH | Age: 84
End: 2019-07-18
Payer: MEDICARE

## 2019-07-18 ENCOUNTER — HOME CARE VISIT (OUTPATIENT)
Dept: SCHEDULING | Facility: HOME HEALTH | Age: 84
End: 2019-07-18
Payer: MEDICARE

## 2019-07-18 VITALS
SYSTOLIC BLOOD PRESSURE: 131 MMHG | TEMPERATURE: 98.6 F | HEART RATE: 57 BPM | DIASTOLIC BLOOD PRESSURE: 57 MMHG | OXYGEN SATURATION: 98 %

## 2019-07-18 PROCEDURE — G0151 HHCP-SERV OF PT,EA 15 MIN: HCPCS

## 2019-07-18 PROCEDURE — G0155 HHCP-SVS OF CSW,EA 15 MIN: HCPCS

## 2019-07-19 ENCOUNTER — HOME CARE VISIT (OUTPATIENT)
Dept: SCHEDULING | Facility: HOME HEALTH | Age: 84
End: 2019-07-19
Payer: MEDICARE

## 2019-07-22 ENCOUNTER — HOME CARE VISIT (OUTPATIENT)
Dept: HOME HEALTH SERVICES | Facility: HOME HEALTH | Age: 84
End: 2019-07-22
Payer: MEDICARE

## 2019-07-23 ENCOUNTER — HOME CARE VISIT (OUTPATIENT)
Dept: SCHEDULING | Facility: HOME HEALTH | Age: 84
End: 2019-07-23
Payer: MEDICARE

## 2019-07-23 PROCEDURE — G0152 HHCP-SERV OF OT,EA 15 MIN: HCPCS

## 2019-07-23 PROCEDURE — G0151 HHCP-SERV OF PT,EA 15 MIN: HCPCS

## 2019-07-23 NOTE — Clinical Note
Pt was seen for skilled OT services due to frequent falls. Pt refused OT services. Patient education provided on safety with ADL/IADLs and fall prevention. Patient reports understanding. Patient has aide and family who assists her with ADLs/IADLs. DC from [facility] OT due to patient refusal of further OT services.

## 2019-07-24 VITALS
TEMPERATURE: 97.8 F | OXYGEN SATURATION: 97 % | SYSTOLIC BLOOD PRESSURE: 156 MMHG | DIASTOLIC BLOOD PRESSURE: 73 MMHG | HEART RATE: 61 BPM

## 2019-07-24 VITALS
OXYGEN SATURATION: 97 % | HEART RATE: 61 BPM | SYSTOLIC BLOOD PRESSURE: 156 MMHG | TEMPERATURE: 97.8 F | DIASTOLIC BLOOD PRESSURE: 73 MMHG

## 2019-07-25 ENCOUNTER — HOME CARE VISIT (OUTPATIENT)
Dept: SCHEDULING | Facility: HOME HEALTH | Age: 84
End: 2019-07-25
Payer: MEDICARE

## 2019-07-25 PROCEDURE — G0151 HHCP-SERV OF PT,EA 15 MIN: HCPCS

## 2019-07-25 NOTE — Clinical Note
Dr Susannah Florez and Giovanni Oneal was discharged from home health PT services per pt request. Pt's CGs are independent with HEP and pt is SBA with mobility.

## 2019-07-26 VITALS — SYSTOLIC BLOOD PRESSURE: 170 MMHG | HEART RATE: 63 BPM | DIASTOLIC BLOOD PRESSURE: 68 MMHG | OXYGEN SATURATION: 99 %

## 2019-08-02 RX ORDER — LANOLIN ALCOHOL/MO/W.PET/CERES
CREAM (GRAM) TOPICAL
Qty: 180 TAB | Refills: 3 | Status: SHIPPED | OUTPATIENT
Start: 2019-08-02 | End: 2020-04-13 | Stop reason: SDUPTHER

## 2020-01-26 PROBLEM — I50.33 ACUTE ON CHRONIC DIASTOLIC CHF (CONGESTIVE HEART FAILURE) (HCC): Status: ACTIVE | Noted: 2020-01-26

## 2020-01-26 PROBLEM — K92.2 GI BLEED: Status: ACTIVE | Noted: 2020-01-26

## 2020-01-26 PROBLEM — N30.01 ACUTE CYSTITIS WITH HEMATURIA: Status: ACTIVE | Noted: 2020-01-26

## 2020-01-29 PROBLEM — H40.9 GLAUCOMA: Chronic | Status: ACTIVE | Noted: 2020-01-29

## 2025-03-22 NOTE — PATIENT INSTRUCTIONS
Please get 3 blood pressure checks done over the next 4 weeks and send me the numbers. Medicare Wellness Visit, Female    The best way to live healthy is to have a healthy lifestyle by eating a well-balanced diet, exercising regularly, limiting alcohol and stopping smoking. Regular physical exams and screening tests are another way to keep healthy. Preventive exams provided by your health care provider can find health problems before they become diseases or illnesses. Preventive services including immunizations, screening tests, monitoring and exams can help you take care of your own health. All people over age 72 should have a pneumovax  and and a prevnar shot to prevent pneumonia. These are once in a lifetime unless you and your provider decide differently. All people over 65 should have a yearly flu shot and a tetanus vaccine every 10 years. A bone mass density to screen for osteoporosis or thinning of the bones should be done every 2 years after 65. Screening for diabetes mellitus with a blood sugar test should be done every year. Glaucoma is a disease of the eye due to increased ocular pressure that can lead to blindness and it should be done every year by an eye professional.    Cardiovascular screening tests that check for elevated lipids (fatty part of blood) which can lead to heart disease and strokes should be done every 5 years. Colorectal screening that evaluates for blood or polyps in your colon should be done yearly as a stool test or every five years as a flexible sigmoidoscope or every 10 years as a colonoscopy up to age 76. Breast cancer screening with a mammogram is recommended biennially  for women age 54-69. Screening for cervical cancer with a pap smear and pelvic exam is recommended for women after age 72 years every 2 years up to age 79 or when the provider and patient decide to stop. If there is a history of cervical abnormalities or other increased risk for cancer then the test is recommended yearly. Hepatitis C screening is also recommended for anyone born between [birth year] through [address]. A shingles vaccine is also recommended once in a lifetime after age 61. Your Medicare Wellness Exam is recommended annually. Here is a list of your current Health Maintenance items with a due date:  Health Maintenance Due   Topic Date Due    Shingles Vaccine  09/14/1989    Bone Density Screening  11/14/1994    Eye Exam  03/21/2015    Glaucoma Screening   03/21/2016    Hemoglobin A1C    04/27/2017    Annual Well Visit  07/29/2017       Medicare Wellness Visit, Female    The best way to live healthy is to have a healthy lifestyle by eating a well-balanced diet, exercising regularly, limiting alcohol and stopping smoking. Regular physical exams and screening tests are another way to keep healthy. Preventive exams provided by your health care provider can find health problems before they become diseases or illnesses. Preventive services including immunizations, screening tests, monitoring and exams can help you take care of your own health. All people over age 72 should have a pneumovax  and and a prevnar shot to prevent pneumonia. These are once in a lifetime unless you and your provider decide differently. All people over 65 should have a yearly flu shot and a tetanus vaccine every 10 years. A bone mass density to screen for osteoporosis or thinning of the bones should be done every 2 years after 65. Screening for diabetes mellitus with a blood sugar test should be done every year. Glaucoma is a disease of the eye due to increased ocular pressure that can lead to blindness and it should be done every year by an eye professional.    Cardiovascular screening tests that check for elevated lipids (fatty part of blood) which can lead to heart disease and strokes should be done every 5 years.     Colorectal screening that evaluates for blood or polyps in your colon should be done yearly as a stool test or every five years as a flexible sigmoidoscope or every 10 years as a colonoscopy up to age 76. Breast cancer screening with a mammogram is recommended biennially  for women age 54-69. Screening for cervical cancer with a pap smear and pelvic exam is recommended for women after age 72 years every 2 years up to age 79 or when the provider and patient decide to stop. If there is a history of cervical abnormalities or other increased risk for cancer then the test is recommended yearly. Hepatitis C screening is also recommended for anyone born between [birth year] [address]. A shingles vaccine is also recommended once in a lifetime after age 61. Your Medicare Wellness Exam is recommended annually.     Here is a list of your current Health Maintenance items with a due date:  Health Maintenance Due   Topic Date Due    Shingles Vaccine  09/14/1989    Bone Density Screening  11/14/1994    Eye Exam  03/21/2015    Glaucoma Screening   03/21/2016    Hemoglobin A1C    04/27/2017    Annual Well Visit  07/29/2017 headache